# Patient Record
Sex: FEMALE | Race: WHITE | NOT HISPANIC OR LATINO
[De-identification: names, ages, dates, MRNs, and addresses within clinical notes are randomized per-mention and may not be internally consistent; named-entity substitution may affect disease eponyms.]

---

## 2017-07-14 ENCOUNTER — APPOINTMENT (OUTPATIENT)
Dept: OBGYN | Facility: CLINIC | Age: 37
End: 2017-07-14

## 2017-10-05 ENCOUNTER — APPOINTMENT (OUTPATIENT)
Dept: DERMATOLOGY | Facility: CLINIC | Age: 37
End: 2017-10-05

## 2017-11-03 ENCOUNTER — INPATIENT (INPATIENT)
Facility: HOSPITAL | Age: 37
LOS: 4 days | Discharge: HOME | End: 2017-11-08
Attending: OBSTETRICS & GYNECOLOGY | Admitting: OBSTETRICS & GYNECOLOGY
Payer: COMMERCIAL

## 2017-11-03 VITALS — WEIGHT: 156.53 LBS | HEIGHT: 64 IN

## 2017-11-03 LAB
ABO RH CONFIRMATION: SIGNIFICANT CHANGE UP
BLD GP AB SCN SERPL QL: SIGNIFICANT CHANGE UP
BLD GP AB SCN SERPL QL: SIGNIFICANT CHANGE UP
HCT VFR BLD CALC: 37.2 % — SIGNIFICANT CHANGE UP (ref 34.5–45)
HGB BLD-MCNC: 12.5 G/DL — SIGNIFICANT CHANGE UP (ref 11.5–15.5)
MCHC RBC-ENTMCNC: 30.2 PG — SIGNIFICANT CHANGE UP (ref 27–34)
MCHC RBC-ENTMCNC: 33.6 GM/DL — SIGNIFICANT CHANGE UP (ref 32–36)
MCV RBC AUTO: 89.8 FL — SIGNIFICANT CHANGE UP (ref 80–100)
PLATELET # BLD AUTO: 212 K/UL — SIGNIFICANT CHANGE UP (ref 150–400)
RBC # BLD: 4.14 M/UL — SIGNIFICANT CHANGE UP (ref 3.8–5.2)
RBC # FLD: 12.1 % — SIGNIFICANT CHANGE UP (ref 10.3–14.5)
T PALLIDUM AB TITR SER: NEGATIVE — SIGNIFICANT CHANGE UP
TYPE + AB SCN PNL BLD: SIGNIFICANT CHANGE UP
TYPE + AB SCN PNL BLD: SIGNIFICANT CHANGE UP
WBC # BLD: 11.4 K/UL — HIGH (ref 3.8–10.5)
WBC # FLD AUTO: 11.4 K/UL — HIGH (ref 3.8–10.5)

## 2017-11-03 PROCEDURE — 59514 CESAREAN DELIVERY ONLY: CPT | Mod: 80,U9

## 2017-11-03 RX ORDER — CITRIC ACID/SODIUM CITRATE 300-500 MG
15 SOLUTION, ORAL ORAL EVERY 4 HOURS
Qty: 0 | Refills: 0 | Status: DISCONTINUED | OUTPATIENT
Start: 2017-11-03 | End: 2017-11-05

## 2017-11-03 RX ORDER — PENICILLIN G POTASSIUM 5000000 [IU]/1
5 POWDER, FOR SOLUTION INTRAMUSCULAR; INTRAPLEURAL; INTRATHECAL; INTRAVENOUS ONCE
Qty: 0 | Refills: 0 | Status: COMPLETED | OUTPATIENT
Start: 2017-11-03 | End: 2017-11-03

## 2017-11-03 RX ORDER — SODIUM CHLORIDE 9 MG/ML
1000 INJECTION, SOLUTION INTRAVENOUS
Qty: 0 | Refills: 0 | Status: DISCONTINUED | OUTPATIENT
Start: 2017-11-03 | End: 2017-11-08

## 2017-11-03 RX ORDER — PENICILLIN G POTASSIUM 5000000 [IU]/1
POWDER, FOR SOLUTION INTRAMUSCULAR; INTRAPLEURAL; INTRATHECAL; INTRAVENOUS
Qty: 0 | Refills: 0 | Status: DISCONTINUED | OUTPATIENT
Start: 2017-11-03 | End: 2017-11-05

## 2017-11-03 RX ORDER — OXYTOCIN 10 UNIT/ML
2 VIAL (ML) INJECTION
Qty: 20 | Refills: 0 | Status: COMPLETED | OUTPATIENT
Start: 2017-11-03

## 2017-11-03 RX ORDER — PENICILLIN G POTASSIUM 5000000 [IU]/1
2.5 POWDER, FOR SOLUTION INTRAMUSCULAR; INTRAPLEURAL; INTRATHECAL; INTRAVENOUS EVERY 4 HOURS
Qty: 0 | Refills: 0 | Status: DISCONTINUED | OUTPATIENT
Start: 2017-11-03 | End: 2017-11-05

## 2017-11-03 RX ORDER — SODIUM CHLORIDE 9 MG/ML
1000 INJECTION, SOLUTION INTRAVENOUS ONCE
Qty: 0 | Refills: 0 | Status: DISCONTINUED | OUTPATIENT
Start: 2017-11-03 | End: 2017-11-08

## 2017-11-03 RX ADMIN — SODIUM CHLORIDE 125 MILLILITER(S): 9 INJECTION, SOLUTION INTRAVENOUS at 13:45

## 2017-11-03 RX ADMIN — SODIUM CHLORIDE 125 MILLILITER(S): 9 INJECTION, SOLUTION INTRAVENOUS at 14:29

## 2017-11-04 RX ORDER — DOCUSATE SODIUM 100 MG
100 CAPSULE ORAL
Qty: 0 | Refills: 0 | Status: DISCONTINUED | OUTPATIENT
Start: 2017-11-04 | End: 2017-11-08

## 2017-11-04 RX ORDER — DIPHENHYDRAMINE HCL 50 MG
25 CAPSULE ORAL EVERY 4 HOURS
Qty: 0 | Refills: 0 | Status: DISCONTINUED | OUTPATIENT
Start: 2017-11-05 | End: 2017-11-08

## 2017-11-04 RX ORDER — GLYCERIN ADULT
1 SUPPOSITORY, RECTAL RECTAL AT BEDTIME
Qty: 0 | Refills: 0 | Status: DISCONTINUED | OUTPATIENT
Start: 2017-11-04 | End: 2017-11-08

## 2017-11-04 RX ORDER — ACETAMINOPHEN 500 MG
650 TABLET ORAL EVERY 6 HOURS
Qty: 0 | Refills: 0 | Status: DISCONTINUED | OUTPATIENT
Start: 2017-11-04 | End: 2017-11-08

## 2017-11-04 RX ORDER — OXYTOCIN 10 UNIT/ML
2 VIAL (ML) INJECTION
Qty: 20 | Refills: 0 | Status: COMPLETED | OUTPATIENT
Start: 2017-11-04 | End: 2017-11-04

## 2017-11-04 RX ORDER — NALOXONE HYDROCHLORIDE 4 MG/.1ML
0.1 SPRAY NASAL
Qty: 0 | Refills: 0 | Status: DISCONTINUED | OUTPATIENT
Start: 2017-11-05 | End: 2017-11-08

## 2017-11-04 RX ORDER — SIMETHICONE 80 MG/1
80 TABLET, CHEWABLE ORAL EVERY 4 HOURS
Qty: 0 | Refills: 0 | Status: DISCONTINUED | OUTPATIENT
Start: 2017-11-04 | End: 2017-11-08

## 2017-11-04 RX ORDER — FERROUS SULFATE 325(65) MG
325 TABLET ORAL DAILY
Qty: 0 | Refills: 0 | Status: DISCONTINUED | OUTPATIENT
Start: 2017-11-04 | End: 2017-11-08

## 2017-11-04 RX ORDER — LANOLIN
1 OINTMENT (GRAM) TOPICAL
Qty: 0 | Refills: 0 | Status: DISCONTINUED | OUTPATIENT
Start: 2017-11-04 | End: 2017-11-08

## 2017-11-04 RX ORDER — TETANUS TOXOID, REDUCED DIPHTHERIA TOXOID AND ACELLULAR PERTUSSIS VACCINE, ADSORBED 5; 2.5; 8; 8; 2.5 [IU]/.5ML; [IU]/.5ML; UG/.5ML; UG/.5ML; UG/.5ML
0.5 SUSPENSION INTRAMUSCULAR ONCE
Qty: 0 | Refills: 0 | Status: DISCONTINUED | OUTPATIENT
Start: 2017-11-04 | End: 2017-11-08

## 2017-11-04 RX ORDER — METOCLOPRAMIDE HCL 10 MG
10 TABLET ORAL ONCE
Qty: 0 | Refills: 0 | Status: DISCONTINUED | OUTPATIENT
Start: 2017-11-04 | End: 2017-11-08

## 2017-11-04 RX ORDER — OXYTOCIN 10 UNIT/ML
2 VIAL (ML) INJECTION
Qty: 30 | Refills: 0 | Status: DISCONTINUED | OUTPATIENT
Start: 2017-11-04 | End: 2017-11-08

## 2017-11-04 RX ORDER — DIPHENHYDRAMINE HCL 50 MG
25 CAPSULE ORAL EVERY 6 HOURS
Qty: 0 | Refills: 0 | Status: DISCONTINUED | OUTPATIENT
Start: 2017-11-04 | End: 2017-11-08

## 2017-11-04 RX ORDER — HYDROMORPHONE HYDROCHLORIDE 2 MG/ML
1 INJECTION INTRAMUSCULAR; INTRAVENOUS; SUBCUTANEOUS
Qty: 0 | Refills: 0 | Status: DISCONTINUED | OUTPATIENT
Start: 2017-11-05 | End: 2017-11-08

## 2017-11-04 RX ORDER — IBUPROFEN 200 MG
600 TABLET ORAL EVERY 6 HOURS
Qty: 0 | Refills: 0 | Status: DISCONTINUED | OUTPATIENT
Start: 2017-11-04 | End: 2017-11-08

## 2017-11-04 RX ORDER — MORPHINE SULFATE 50 MG/1
0.15 CAPSULE, EXTENDED RELEASE ORAL ONCE
Qty: 0 | Refills: 0 | Status: DISCONTINUED | OUTPATIENT
Start: 2017-11-05 | End: 2017-11-08

## 2017-11-04 RX ORDER — OXYCODONE HYDROCHLORIDE 5 MG/1
5 TABLET ORAL
Qty: 0 | Refills: 0 | Status: DISCONTINUED | OUTPATIENT
Start: 2017-11-05 | End: 2017-11-08

## 2017-11-04 RX ORDER — ONDANSETRON 8 MG/1
4 TABLET, FILM COATED ORAL EVERY 6 HOURS
Qty: 0 | Refills: 0 | Status: DISCONTINUED | OUTPATIENT
Start: 2017-11-05 | End: 2017-11-08

## 2017-11-04 RX ORDER — OXYCODONE HYDROCHLORIDE 5 MG/1
10 TABLET ORAL
Qty: 0 | Refills: 0 | Status: DISCONTINUED | OUTPATIENT
Start: 2017-11-05 | End: 2017-11-08

## 2017-11-04 RX ORDER — HEPARIN SODIUM 5000 [USP'U]/ML
5000 INJECTION INTRAVENOUS; SUBCUTANEOUS EVERY 12 HOURS
Qty: 0 | Refills: 0 | Status: CANCELLED | OUTPATIENT
Start: 2017-11-05 | End: 2017-11-08

## 2017-11-04 RX ADMIN — PENICILLIN G POTASSIUM 200 MILLION UNIT(S): 5000000 POWDER, FOR SOLUTION INTRAMUSCULAR; INTRAPLEURAL; INTRATHECAL; INTRAVENOUS at 20:50

## 2017-11-04 RX ADMIN — SODIUM CHLORIDE 125 MILLILITER(S): 9 INJECTION, SOLUTION INTRAVENOUS at 22:53

## 2017-11-04 RX ADMIN — SODIUM CHLORIDE 125 MILLILITER(S): 9 INJECTION, SOLUTION INTRAVENOUS at 12:26

## 2017-11-04 RX ADMIN — PENICILLIN G POTASSIUM 200 MILLION UNIT(S): 5000000 POWDER, FOR SOLUTION INTRAMUSCULAR; INTRAPLEURAL; INTRATHECAL; INTRAVENOUS at 16:11

## 2017-11-04 RX ADMIN — Medication 6 MILLIUNIT(S)/MIN: at 23:18

## 2017-11-04 RX ADMIN — Medication 2 MILLIUNIT(S)/MIN: at 15:11

## 2017-11-05 LAB
BASOPHILS # BLD AUTO: 0 K/UL — SIGNIFICANT CHANGE UP (ref 0–0.2)
BASOPHILS NFR BLD AUTO: 0.2 % — SIGNIFICANT CHANGE UP (ref 0–2)
EOSINOPHIL # BLD AUTO: 0.1 K/UL — SIGNIFICANT CHANGE UP (ref 0–0.5)
EOSINOPHIL NFR BLD AUTO: 0.6 % — SIGNIFICANT CHANGE UP (ref 0–6)
HCT VFR BLD CALC: 34.3 % — LOW (ref 34.5–45)
HGB BLD-MCNC: 11.2 G/DL — LOW (ref 11.5–15.5)
LYMPHOCYTES # BLD AUTO: 1.2 K/UL — SIGNIFICANT CHANGE UP (ref 1–3.3)
LYMPHOCYTES # BLD AUTO: 6.8 % — LOW (ref 13–44)
MCHC RBC-ENTMCNC: 30 PG — SIGNIFICANT CHANGE UP (ref 27–34)
MCHC RBC-ENTMCNC: 32.7 GM/DL — SIGNIFICANT CHANGE UP (ref 32–36)
MCV RBC AUTO: 91.7 FL — SIGNIFICANT CHANGE UP (ref 80–100)
MONOCYTES # BLD AUTO: 0.7 K/UL — SIGNIFICANT CHANGE UP (ref 0–0.9)
MONOCYTES NFR BLD AUTO: 3.8 % — SIGNIFICANT CHANGE UP (ref 2–14)
NEUTROPHILS # BLD AUTO: 15.6 K/UL — HIGH (ref 1.8–7.4)
NEUTROPHILS NFR BLD AUTO: 88.7 % — HIGH (ref 43–77)
PLATELET # BLD AUTO: 183 K/UL — SIGNIFICANT CHANGE UP (ref 150–400)
RBC # BLD: 3.74 M/UL — LOW (ref 3.8–5.2)
RBC # FLD: 12.6 % — SIGNIFICANT CHANGE UP (ref 10.3–14.5)
WBC # BLD: 17.5 K/UL — HIGH (ref 3.8–10.5)
WBC # FLD AUTO: 17.5 K/UL — HIGH (ref 3.8–10.5)

## 2017-11-05 RX ADMIN — Medication 600 MILLIGRAM(S): at 21:50

## 2017-11-05 RX ADMIN — Medication 1 TABLET(S): at 12:44

## 2017-11-05 RX ADMIN — Medication 100 MILLIGRAM(S): at 20:50

## 2017-11-05 RX ADMIN — Medication 600 MILLIGRAM(S): at 13:45

## 2017-11-05 RX ADMIN — Medication 600 MILLIGRAM(S): at 20:50

## 2017-11-05 RX ADMIN — Medication 600 MILLIGRAM(S): at 06:57

## 2017-11-05 RX ADMIN — OXYCODONE HYDROCHLORIDE 10 MILLIGRAM(S): 5 TABLET ORAL at 21:09

## 2017-11-05 RX ADMIN — Medication 600 MILLIGRAM(S): at 12:46

## 2017-11-05 RX ADMIN — OXYCODONE HYDROCHLORIDE 5 MILLIGRAM(S): 5 TABLET ORAL at 13:45

## 2017-11-05 RX ADMIN — SODIUM CHLORIDE 125 MILLILITER(S): 9 INJECTION, SOLUTION INTRAVENOUS at 06:58

## 2017-11-05 RX ADMIN — HEPARIN SODIUM 5000 UNIT(S): 5000 INJECTION INTRAVENOUS; SUBCUTANEOUS at 06:57

## 2017-11-05 RX ADMIN — HEPARIN SODIUM 5000 UNIT(S): 5000 INJECTION INTRAVENOUS; SUBCUTANEOUS at 18:05

## 2017-11-05 RX ADMIN — Medication 600 MILLIGRAM(S): at 07:57

## 2017-11-05 RX ADMIN — Medication 325 MILLIGRAM(S): at 12:44

## 2017-11-05 RX ADMIN — OXYCODONE HYDROCHLORIDE 10 MILLIGRAM(S): 5 TABLET ORAL at 22:09

## 2017-11-05 RX ADMIN — SIMETHICONE 80 MILLIGRAM(S): 80 TABLET, CHEWABLE ORAL at 20:50

## 2017-11-05 RX ADMIN — OXYCODONE HYDROCHLORIDE 5 MILLIGRAM(S): 5 TABLET ORAL at 12:45

## 2017-11-05 NOTE — PROGRESS NOTE ADULT - SUBJECTIVE AND OBJECTIVE BOX
Postpartum Note,  Section  She is a  37y woman who is now post-operative day: 1    Subjective:  The patient feels very sad, that the delivery did not go according to her plan/ her baby is in special care being monitored for low sugar.   She is ambulating.   She is tolerating regular diet.  She denies nausea and vomiting.    Her pain is controlled.  She reports normal postpartum bleeding.  She is breastfeeding.      Physical exam:    Vital Signs Last 24 Hrs  T(C): 37.2 (2017 07:10), Max: 37.2 (2017 07:10)  T(F): 99 (2017 07:10), Max: 99 (2017 07:10)  HR: 83 (2017 07:10) (69 - 86)  BP: 120/65 (2017 07:10) (109/61 - 132/76)  BP(mean): --  RR: 16 (2017 07:10) (16 - 18)  SpO2: 98% (2017 07:10) (98% - 100%)    Gen: NAD  Breast: Soft, nontender, not engorged.  Abdomen: Soft, nontender, no distension , firm uterine fundus at umbilicus.  Incision: dressing in place   Pelvic: Normal lochia noted  Ext: No calf tenderness    LABS:                        12.5   11.4  )-----------( 212      ( 2017 14:58 )             37.2     B negative    Rubella status:immune     Allergies    No Known Allergies    Intolerances      MEDICATIONS  (STANDING):  diphtheria/tetanus/pertussis (acellular) Vaccine (ADAcel) 0.5 milliLiter(s) IntraMuscular once  ferrous    sulfate 325 milliGRAM(s) Oral daily  lactated ringers Bolus 1000 milliLiter(s) IV Bolus once  lactated ringers. 1000 milliLiter(s) (125 mL/Hr) IV Continuous <Continuous>  morphine PF Spinal 0.15 milliGRAM(s) IntraThecal. once  oxytocin Infusion 2 milliUNIT(s)/Min (2 mL/Hr) IV Continuous <Continuous>  prenatal multivitamin 1 Tablet(s) Oral daily    MEDICATIONS  (PRN):  acetaminophen   Tablet 650 milliGRAM(s) Oral every 6 hours PRN For Temp greater than 38.5 C (101.3 F)  diphenhydrAMINE   Capsule 25 milliGRAM(s) Oral every 6 hours PRN Itching  diphenhydrAMINE   Injectable 25 milliGRAM(s) IV Push every 4 hours PRN Pruritus  docusate sodium 100 milliGRAM(s) Oral two times a day PRN Stool Softening  glycerin Suppository - Adult 1 Suppository(s) Rectal at bedtime PRN Constipation  HYDROmorphone  Injectable 1 milliGRAM(s) IV Push every 3 hours PRN Severe Pain  ibuprofen  Tablet 600 milliGRAM(s) Oral every 6 hours PRN Mild pain or headache  lanolin Ointment 1 Application(s) Topical every 3 hours PRN Sore Nipples  metoclopramide Injectable 10 milliGRAM(s) IV Push once PRN Nausea and/or Vomiting  naloxone Injectable 0.1 milliGRAM(s) IV Push every 3 minutes PRN For ANY of the following changes in patient status:  A. RR LESS THAN 10 breaths per minute, B. Oxygen saturation LESS THAN 90%, C. Sedation score of 6  ondansetron Injectable 4 milliGRAM(s) IV Push every 6 hours PRN Nausea  oxyCODONE    IR 5 milliGRAM(s) Oral every 3 hours PRN Mild Pain  oxyCODONE    IR 10 milliGRAM(s) Oral every 3 hours PRN Moderate Pain  simethicone 80 milliGRAM(s) Chew every 4 hours PRN Gas        Assessment and Plan  POD # 1  s/p cesearean section  Doing well.  Encourage ambulation.    Follow up baby type and screen

## 2017-11-06 RX ADMIN — OXYCODONE HYDROCHLORIDE 10 MILLIGRAM(S): 5 TABLET ORAL at 07:00

## 2017-11-06 RX ADMIN — HEPARIN SODIUM 5000 UNIT(S): 5000 INJECTION INTRAVENOUS; SUBCUTANEOUS at 18:09

## 2017-11-06 RX ADMIN — OXYCODONE HYDROCHLORIDE 10 MILLIGRAM(S): 5 TABLET ORAL at 07:25

## 2017-11-06 RX ADMIN — Medication 1 TABLET(S): at 12:22

## 2017-11-06 RX ADMIN — Medication 600 MILLIGRAM(S): at 03:59

## 2017-11-06 RX ADMIN — Medication 600 MILLIGRAM(S): at 18:09

## 2017-11-06 RX ADMIN — Medication 600 MILLIGRAM(S): at 12:22

## 2017-11-06 RX ADMIN — Medication 600 MILLIGRAM(S): at 02:59

## 2017-11-06 RX ADMIN — HEPARIN SODIUM 5000 UNIT(S): 5000 INJECTION INTRAVENOUS; SUBCUTANEOUS at 07:24

## 2017-11-06 NOTE — PROGRESS NOTE ADULT - SUBJECTIVE AND OBJECTIVE BOX
Postpartum Note,  Section  She is a  37y woman who is now post-operative day: 2  chart reviewed and nursing input solicited    Subjective:  The patient feels well.  complaints: none  her pain is controlled; infant in special care with IV for hypoglycemia -- emotionally appropriate  she reports normal postpartum bleeding--- she is tolerating a regular diet and ambulating well.  focused ROS: negative      Physical exam:    Vital Signs Last 24 Hrs  T(C): 36.7 (2017 07:30), Max: 36.9 (2017 11:15)  T(F): 98.1 (2017 07:30), Max: 98.5 (2017 11:15)  HR: 73 (2017 07:30) (73 - 97)  BP: 110/51 (2017 07:30) (97/52 - 113/52)  BP(mean): --  RR: 16 (2017 07:30) (16 - 16)  SpO2: 99% (2017 07:30) (98% - 99%)     Abdomen: Soft, nontender, non-distended, uterine fundus firm and  below umbilicus.  Incision: Clean, dry, and intact    Pelvic: Normal lochia noted  Ext: lower extremeities symmetric and calves non-tender    LABS:                        11.2   17.5  )-----------( 183      ( 2017 18:08 )             34.3       Rubella status: immune  RH status: positive    Allergies    No Known Allergies    Intolerances      MEDICATIONS  (STANDING):  diphtheria/tetanus/pertussis (acellular) Vaccine (ADAcel) 0.5 milliLiter(s) IntraMuscular once  ferrous    sulfate 325 milliGRAM(s) Oral daily  lactated ringers Bolus 1000 milliLiter(s) IV Bolus once  lactated ringers. 1000 milliLiter(s) (125 mL/Hr) IV Continuous <Continuous>  morphine PF Spinal 0.15 milliGRAM(s) IntraThecal. once  oxytocin Infusion 2 milliUNIT(s)/Min (2 mL/Hr) IV Continuous <Continuous>  prenatal multivitamin 1 Tablet(s) Oral daily    MEDICATIONS  (PRN):  acetaminophen   Tablet 650 milliGRAM(s) Oral every 6 hours PRN For Temp greater than 38.5 C (101.3 F)  diphenhydrAMINE   Capsule 25 milliGRAM(s) Oral every 6 hours PRN Itching  diphenhydrAMINE   Injectable 25 milliGRAM(s) IV Push every 4 hours PRN Pruritus  docusate sodium 100 milliGRAM(s) Oral two times a day PRN Stool Softening  glycerin Suppository - Adult 1 Suppository(s) Rectal at bedtime PRN Constipation  HYDROmorphone  Injectable 1 milliGRAM(s) IV Push every 3 hours PRN Severe Pain  ibuprofen  Tablet 600 milliGRAM(s) Oral every 6 hours PRN Mild pain or headache  lanolin Ointment 1 Application(s) Topical every 3 hours PRN Sore Nipples  metoclopramide Injectable 10 milliGRAM(s) IV Push once PRN Nausea and/or Vomiting  naloxone Injectable 0.1 milliGRAM(s) IV Push every 3 minutes PRN For ANY of the following changes in patient status:  A. RR LESS THAN 10 breaths per minute, B. Oxygen saturation LESS THAN 90%, C. Sedation score of 6  ondansetron Injectable 4 milliGRAM(s) IV Push every 6 hours PRN Nausea  oxyCODONE    IR 5 milliGRAM(s) Oral every 3 hours PRN Mild Pain  oxyCODONE    IR 10 milliGRAM(s) Oral every 3 hours PRN Moderate Pain  simethicone 80 milliGRAM(s) Chew every 4 hours PRN Gas        Assessment and Plan  Doing well. Routine care

## 2017-11-07 RX ADMIN — HEPARIN SODIUM 5000 UNIT(S): 5000 INJECTION INTRAVENOUS; SUBCUTANEOUS at 17:30

## 2017-11-07 RX ADMIN — Medication 600 MILLIGRAM(S): at 01:54

## 2017-11-07 RX ADMIN — Medication 600 MILLIGRAM(S): at 20:30

## 2017-11-07 RX ADMIN — Medication 1 TABLET(S): at 11:43

## 2017-11-07 RX ADMIN — Medication 600 MILLIGRAM(S): at 02:45

## 2017-11-07 RX ADMIN — Medication 600 MILLIGRAM(S): at 19:34

## 2017-11-07 RX ADMIN — Medication 600 MILLIGRAM(S): at 08:46

## 2017-11-07 RX ADMIN — HEPARIN SODIUM 5000 UNIT(S): 5000 INJECTION INTRAVENOUS; SUBCUTANEOUS at 06:55

## 2017-11-07 NOTE — PROGRESS NOTE ADULT - SUBJECTIVE AND OBJECTIVE BOX
Postpartum Note,  Section  She is a  37y woman who is now post-operative day: 3  chart reviewed and nursing input solicited    Subjective:  The patient feels well. Interviewed in the nursery  complaints: none  her pain is controlled  she reports normal postpartum bleeding--- she is tolerating a regular diet and ambulating well.  focused ROS: negative      Physical exam:    Vital Signs Last 24 Hrs  T(C): 36.7 (2017 07:40), Max: 36.9 (2017 19:30)  T(F): 98 (2017 07:40), Max: 98.4 (2017 19:30)  HR: 86 (2017 07:40) (86 - 89)  BP: 120/77 (2017 07:40) (120/77 - 123/68)  BP(mean): --  RR: 16 (2017 07:40) (16 - 16)  SpO2: 100% (2017 07:40) (98% - 100%)     Abdomen: Soft, nontender, non-distended, uterine fundus firm and  below umbilicus.  Incision: Clean, dry, and intact with steri strips -- per nursing  Pelvic: Normal lochia noted -- per nursing  Ext: lower extremeities symmetric and calves non-tender    LABS:                        11.2   17.5  )-----------( 183      ( 2017 18:08 )             34.3       Rubella status: immune  RH status: pos    Allergies    No Known Allergies    Intolerances      MEDICATIONS  (STANDING):  diphtheria/tetanus/pertussis (acellular) Vaccine (ADAcel) 0.5 milliLiter(s) IntraMuscular once  ferrous    sulfate 325 milliGRAM(s) Oral daily  lactated ringers Bolus 1000 milliLiter(s) IV Bolus once  lactated ringers. 1000 milliLiter(s) (125 mL/Hr) IV Continuous <Continuous>  morphine PF Spinal 0.15 milliGRAM(s) IntraThecal. once  oxytocin Infusion 2 milliUNIT(s)/Min (2 mL/Hr) IV Continuous <Continuous>  prenatal multivitamin 1 Tablet(s) Oral daily    MEDICATIONS  (PRN):  acetaminophen   Tablet 650 milliGRAM(s) Oral every 6 hours PRN For Temp greater than 38.5 C (101.3 F)  diphenhydrAMINE   Capsule 25 milliGRAM(s) Oral every 6 hours PRN Itching  diphenhydrAMINE   Injectable 25 milliGRAM(s) IV Push every 4 hours PRN Pruritus  docusate sodium 100 milliGRAM(s) Oral two times a day PRN Stool Softening  glycerin Suppository - Adult 1 Suppository(s) Rectal at bedtime PRN Constipation  HYDROmorphone  Injectable 1 milliGRAM(s) IV Push every 3 hours PRN Severe Pain  ibuprofen  Tablet 600 milliGRAM(s) Oral every 6 hours PRN Mild pain or headache  lanolin Ointment 1 Application(s) Topical every 3 hours PRN Sore Nipples  metoclopramide Injectable 10 milliGRAM(s) IV Push once PRN Nausea and/or Vomiting  naloxone Injectable 0.1 milliGRAM(s) IV Push every 3 minutes PRN For ANY of the following changes in patient status:  A. RR LESS THAN 10 breaths per minute, B. Oxygen saturation LESS THAN 90%, C. Sedation score of 6  ondansetron Injectable 4 milliGRAM(s) IV Push every 6 hours PRN Nausea  oxyCODONE    IR 5 milliGRAM(s) Oral every 3 hours PRN Mild Pain  oxyCODONE    IR 10 milliGRAM(s) Oral every 3 hours PRN Moderate Pain  simethicone 80 milliGRAM(s) Chew every 4 hours PRN Gas        Assessment and Plan  Doing well. Routine care -- plan discharge in the AM -- check CBC in the AM

## 2017-11-08 ENCOUNTER — TRANSCRIPTION ENCOUNTER (OUTPATIENT)
Age: 37
End: 2017-11-08

## 2017-11-08 VITALS
DIASTOLIC BLOOD PRESSURE: 67 MMHG | OXYGEN SATURATION: 100 % | HEART RATE: 74 BPM | TEMPERATURE: 98 F | SYSTOLIC BLOOD PRESSURE: 114 MMHG | RESPIRATION RATE: 16 BRPM

## 2017-11-08 RX ADMIN — Medication 600 MILLIGRAM(S): at 16:36

## 2017-11-08 RX ADMIN — Medication 325 MILLIGRAM(S): at 17:42

## 2017-11-08 RX ADMIN — HEPARIN SODIUM 5000 UNIT(S): 5000 INJECTION INTRAVENOUS; SUBCUTANEOUS at 06:51

## 2017-11-08 RX ADMIN — HEPARIN SODIUM 5000 UNIT(S): 5000 INJECTION INTRAVENOUS; SUBCUTANEOUS at 17:43

## 2017-11-08 RX ADMIN — Medication 600 MILLIGRAM(S): at 10:20

## 2017-11-08 RX ADMIN — Medication 1 TABLET(S): at 16:35

## 2017-11-08 RX ADMIN — Medication 600 MILLIGRAM(S): at 01:47

## 2017-11-08 RX ADMIN — Medication 600 MILLIGRAM(S): at 02:45

## 2017-11-08 NOTE — DISCHARGE NOTE OB - PATIENT PORTAL LINK FT
“You can access the FollowHealth Patient Portal, offered by [facility], by registering with the following website: [email or web address]”

## 2017-11-08 NOTE — PROGRESS NOTE ADULT - SUBJECTIVE AND OBJECTIVE BOX
Postpartum Note,  Section  She is a  37y woman who is now post-operative day: 4  chart reviewed and nursing input solicited    Subjective:  The patient feels well.  complaints: none  her pain is controlled  she reports normal postpartum bleeding--- she is tolerating a regular diet and ambulating well.  focused ROS: negative      Physical exam:    Vital Signs Last 24 Hrs  T(C): 36.8 (2017 20:00), Max: 36.8 (2017 20:00)  T(F): 98.2 (2017 20:00), Max: 98.2 (2017 20:00)  HR: 91 (2017 20:00) (86 - 91)  BP: 125/61 (2017 20:00) (120/77 - 125/61)  BP(mean): --  RR: 16 (2017 20:00) (16 - 16)  SpO2: 98% (2017 20:00) (98% - 100%)     Abdomen: Soft, nontender, non-distended, uterine fundus firm and  below umbilicus.  Incision: Clean, dry, and intact  Pelvic: Normal lochia noted  Ext: lower extremeities symmetric and calves non-tender    LABS:      Rubella status: immune  RH status: pos    Allergies    No Known Allergies    Intolerances      MEDICATIONS  (STANDING):  diphtheria/tetanus/pertussis (acellular) Vaccine (ADAcel) 0.5 milliLiter(s) IntraMuscular once  ferrous    sulfate 325 milliGRAM(s) Oral daily  lactated ringers Bolus 1000 milliLiter(s) IV Bolus once  lactated ringers. 1000 milliLiter(s) (125 mL/Hr) IV Continuous <Continuous>  morphine PF Spinal 0.15 milliGRAM(s) IntraThecal. once  oxytocin Infusion 2 milliUNIT(s)/Min (2 mL/Hr) IV Continuous <Continuous>  prenatal multivitamin 1 Tablet(s) Oral daily    MEDICATIONS  (PRN):  acetaminophen   Tablet 650 milliGRAM(s) Oral every 6 hours PRN For Temp greater than 38.5 C (101.3 F)  diphenhydrAMINE   Capsule 25 milliGRAM(s) Oral every 6 hours PRN Itching  diphenhydrAMINE   Injectable 25 milliGRAM(s) IV Push every 4 hours PRN Pruritus  docusate sodium 100 milliGRAM(s) Oral two times a day PRN Stool Softening  glycerin Suppository - Adult 1 Suppository(s) Rectal at bedtime PRN Constipation  HYDROmorphone  Injectable 1 milliGRAM(s) IV Push every 3 hours PRN Severe Pain  ibuprofen  Tablet 600 milliGRAM(s) Oral every 6 hours PRN Mild pain or headache  lanolin Ointment 1 Application(s) Topical every 3 hours PRN Sore Nipples  metoclopramide Injectable 10 milliGRAM(s) IV Push once PRN Nausea and/or Vomiting  naloxone Injectable 0.1 milliGRAM(s) IV Push every 3 minutes PRN For ANY of the following changes in patient status:  A. RR LESS THAN 10 breaths per minute, B. Oxygen saturation LESS THAN 90%, C. Sedation score of 6  ondansetron Injectable 4 milliGRAM(s) IV Push every 6 hours PRN Nausea  oxyCODONE    IR 5 milliGRAM(s) Oral every 3 hours PRN Mild Pain  oxyCODONE    IR 10 milliGRAM(s) Oral every 3 hours PRN Moderate Pain  simethicone 80 milliGRAM(s) Chew every 4 hours PRN Gas        Assessment and Plan  Doing well. Routine care  Plan discharge today -- routine restrictions and instructions  F/U with MD in 3-7 days for incision check  All questions answered

## 2017-11-08 NOTE — DISCHARGE NOTE OB - CARE PLAN
Principal Discharge DX:	Term pregnancy delivered  Goal:	recovery  Instructions for follow-up, activity and diet:	routine post partum restrictions and instructions ---- vaginal restrictions ONLY

## 2017-11-08 NOTE — DISCHARGE NOTE OB - CARE PROVIDER_API CALL
Marcio Simon), Obstetrics and Gynecology  [address] [place] [address]  Phone: [phone]  Fax: [phone]

## 2017-11-09 DIAGNOSIS — O48.0 POST-TERM PREGNANCY: ICD-10-CM

## 2017-11-09 DIAGNOSIS — O09.513 SUPERVISION OF ELDERLY PRIMIGRAVIDA, THIRD TRIMESTER: ICD-10-CM

## 2017-11-09 DIAGNOSIS — Z3A.40 40 WEEKS GESTATION OF PREGNANCY: ICD-10-CM

## 2019-11-17 ENCOUNTER — TRANSCRIPTION ENCOUNTER (OUTPATIENT)
Age: 39
End: 2019-11-17

## 2020-03-17 NOTE — PATIENT PROFILE OB - SKIN TO SKIN
Rhombic Flap Text: The defect edges were debeveled with a #15 scalpel blade.  Given the location of the defect and the proximity to free margins a rhombic flap was deemed most appropriate.  Using a sterile surgical marker, an appropriate rhombic flap was drawn incorporating the defect.    The area thus outlined was incised deep to adipose tissue with a #15 scalpel blade.  The skin margins were undermined to an appropriate distance in all directions utilizing iris scissors. yes

## 2020-07-28 ENCOUNTER — OUTPATIENT (OUTPATIENT)
Dept: OUTPATIENT SERVICES | Facility: HOSPITAL | Age: 40
LOS: 1 days | End: 2020-07-28
Payer: COMMERCIAL

## 2020-07-28 DIAGNOSIS — Z01.818 ENCOUNTER FOR OTHER PREPROCEDURAL EXAMINATION: ICD-10-CM

## 2020-07-28 DIAGNOSIS — O34.211 MATERNAL CARE FOR LOW TRANSVERSE SCAR FROM PREVIOUS CESAREAN DELIVERY: ICD-10-CM

## 2020-07-28 LAB
BASOPHILS # BLD AUTO: 0.04 K/UL — SIGNIFICANT CHANGE UP (ref 0–0.2)
BASOPHILS NFR BLD AUTO: 0.5 % — SIGNIFICANT CHANGE UP (ref 0–2)
EOSINOPHIL # BLD AUTO: 0.11 K/UL — SIGNIFICANT CHANGE UP (ref 0–0.5)
EOSINOPHIL NFR BLD AUTO: 1.4 % — SIGNIFICANT CHANGE UP (ref 0–6)
HCT VFR BLD CALC: 40.7 % — SIGNIFICANT CHANGE UP (ref 34.5–45)
HGB BLD-MCNC: 13.5 G/DL — SIGNIFICANT CHANGE UP (ref 11.5–15.5)
IMM GRANULOCYTES NFR BLD AUTO: 0.5 % — SIGNIFICANT CHANGE UP (ref 0–1.5)
LYMPHOCYTES # BLD AUTO: 1.5 K/UL — SIGNIFICANT CHANGE UP (ref 1–3.3)
LYMPHOCYTES # BLD AUTO: 18.8 % — SIGNIFICANT CHANGE UP (ref 13–44)
MCHC RBC-ENTMCNC: 30.5 PG — SIGNIFICANT CHANGE UP (ref 27–34)
MCHC RBC-ENTMCNC: 33.2 GM/DL — SIGNIFICANT CHANGE UP (ref 32–36)
MCV RBC AUTO: 91.9 FL — SIGNIFICANT CHANGE UP (ref 80–100)
MONOCYTES # BLD AUTO: 0.44 K/UL — SIGNIFICANT CHANGE UP (ref 0–0.9)
MONOCYTES NFR BLD AUTO: 5.5 % — SIGNIFICANT CHANGE UP (ref 2–14)
NEUTROPHILS # BLD AUTO: 5.85 K/UL — SIGNIFICANT CHANGE UP (ref 1.8–7.4)
NEUTROPHILS NFR BLD AUTO: 73.3 % — SIGNIFICANT CHANGE UP (ref 43–77)
PLATELET # BLD AUTO: 200 K/UL — SIGNIFICANT CHANGE UP (ref 150–400)
RBC # BLD: 4.43 M/UL — SIGNIFICANT CHANGE UP (ref 3.8–5.2)
RBC # FLD: 13.1 % — SIGNIFICANT CHANGE UP (ref 10.3–14.5)
WBC # BLD: 7.98 K/UL — SIGNIFICANT CHANGE UP (ref 3.8–10.5)
WBC # FLD AUTO: 7.98 K/UL — SIGNIFICANT CHANGE UP (ref 3.8–10.5)

## 2020-07-28 PROCEDURE — 86850 RBC ANTIBODY SCREEN: CPT

## 2020-07-28 PROCEDURE — 86900 BLOOD TYPING SEROLOGIC ABO: CPT

## 2020-07-28 PROCEDURE — 85025 COMPLETE CBC W/AUTO DIFF WBC: CPT

## 2020-07-28 PROCEDURE — U0003: CPT

## 2020-07-28 PROCEDURE — 86901 BLOOD TYPING SEROLOGIC RH(D): CPT

## 2020-07-28 PROCEDURE — 36415 COLL VENOUS BLD VENIPUNCTURE: CPT

## 2020-07-29 ENCOUNTER — TRANSCRIPTION ENCOUNTER (OUTPATIENT)
Age: 40
End: 2020-07-29

## 2020-07-29 DIAGNOSIS — O34.211 MATERNAL CARE FOR LOW TRANSVERSE SCAR FROM PREVIOUS CESAREAN DELIVERY: ICD-10-CM

## 2020-07-29 DIAGNOSIS — Z01.818 ENCOUNTER FOR OTHER PREPROCEDURAL EXAMINATION: ICD-10-CM

## 2020-07-29 LAB — SARS-COV-2 RNA SPEC QL NAA+PROBE: SIGNIFICANT CHANGE UP

## 2020-07-30 ENCOUNTER — INPATIENT (INPATIENT)
Facility: HOSPITAL | Age: 40
LOS: 1 days | Discharge: HOME | End: 2020-08-01
Attending: OBSTETRICS & GYNECOLOGY | Admitting: OBSTETRICS & GYNECOLOGY
Payer: COMMERCIAL

## 2020-07-30 VITALS — HEIGHT: 64 IN | WEIGHT: 147.71 LBS

## 2020-07-30 DIAGNOSIS — O34.211 MATERNAL CARE FOR LOW TRANSVERSE SCAR FROM PREVIOUS CESAREAN DELIVERY: ICD-10-CM

## 2020-07-30 LAB — T PALLIDUM AB TITR SER: NEGATIVE — SIGNIFICANT CHANGE UP

## 2020-07-30 PROCEDURE — 85461 HEMOGLOBIN FETAL: CPT

## 2020-07-30 PROCEDURE — 94760 N-INVAS EAR/PLS OXIMETRY 1: CPT

## 2020-07-30 PROCEDURE — 85025 COMPLETE CBC W/AUTO DIFF WBC: CPT

## 2020-07-30 PROCEDURE — 85460 HEMOGLOBIN FETAL: CPT

## 2020-07-30 PROCEDURE — 86780 TREPONEMA PALLIDUM: CPT

## 2020-07-30 PROCEDURE — 86769 SARS-COV-2 COVID-19 ANTIBODY: CPT

## 2020-07-30 PROCEDURE — 36415 COLL VENOUS BLD VENIPUNCTURE: CPT

## 2020-07-30 PROCEDURE — 59050 FETAL MONITOR W/REPORT: CPT

## 2020-07-30 RX ORDER — SODIUM CHLORIDE 9 MG/ML
1000 INJECTION, SOLUTION INTRAVENOUS
Refills: 0 | Status: DISCONTINUED | OUTPATIENT
Start: 2020-07-30 | End: 2020-08-01

## 2020-07-30 RX ORDER — KETOROLAC TROMETHAMINE 30 MG/ML
30 SYRINGE (ML) INJECTION EVERY 6 HOURS
Refills: 0 | Status: DISCONTINUED | OUTPATIENT
Start: 2020-07-30 | End: 2020-07-30

## 2020-07-30 RX ORDER — OXYCODONE HYDROCHLORIDE 5 MG/1
5 TABLET ORAL ONCE
Refills: 0 | Status: DISCONTINUED | OUTPATIENT
Start: 2020-07-30 | End: 2020-07-30

## 2020-07-30 RX ORDER — CEFAZOLIN SODIUM 1 G
2000 VIAL (EA) INJECTION ONCE
Refills: 0 | Status: COMPLETED | OUTPATIENT
Start: 2020-07-30 | End: 2020-07-30

## 2020-07-30 RX ORDER — ACETAMINOPHEN 500 MG
1000 TABLET ORAL ONCE
Refills: 0 | Status: COMPLETED | OUTPATIENT
Start: 2020-07-30 | End: 2020-07-30

## 2020-07-30 RX ORDER — ACETAMINOPHEN 500 MG
975 TABLET ORAL
Refills: 0 | Status: DISCONTINUED | OUTPATIENT
Start: 2020-07-30 | End: 2020-07-30

## 2020-07-30 RX ORDER — MAGNESIUM HYDROXIDE 400 MG/1
30 TABLET, CHEWABLE ORAL
Refills: 0 | Status: DISCONTINUED | OUTPATIENT
Start: 2020-07-30 | End: 2020-07-30

## 2020-07-30 RX ORDER — OXYTOCIN 10 UNIT/ML
333.33 VIAL (ML) INJECTION
Qty: 20 | Refills: 0 | Status: DISCONTINUED | OUTPATIENT
Start: 2020-07-30 | End: 2020-07-30

## 2020-07-30 RX ORDER — TETANUS TOXOID, REDUCED DIPHTHERIA TOXOID AND ACELLULAR PERTUSSIS VACCINE, ADSORBED 5; 2.5; 8; 8; 2.5 [IU]/.5ML; [IU]/.5ML; UG/.5ML; UG/.5ML; UG/.5ML
0.5 SUSPENSION INTRAMUSCULAR ONCE
Refills: 0 | Status: DISCONTINUED | OUTPATIENT
Start: 2020-07-30 | End: 2020-08-01

## 2020-07-30 RX ORDER — OXYCODONE HYDROCHLORIDE 5 MG/1
5 TABLET ORAL
Refills: 0 | Status: DISCONTINUED | OUTPATIENT
Start: 2020-07-30 | End: 2020-08-01

## 2020-07-30 RX ORDER — IBUPROFEN 200 MG
600 TABLET ORAL EVERY 6 HOURS
Refills: 0 | Status: DISCONTINUED | OUTPATIENT
Start: 2020-07-30 | End: 2020-07-30

## 2020-07-30 RX ORDER — TETANUS TOXOID, REDUCED DIPHTHERIA TOXOID AND ACELLULAR PERTUSSIS VACCINE, ADSORBED 5; 2.5; 8; 8; 2.5 [IU]/.5ML; [IU]/.5ML; UG/.5ML; UG/.5ML; UG/.5ML
0.5 SUSPENSION INTRAMUSCULAR ONCE
Refills: 0 | Status: DISCONTINUED | OUTPATIENT
Start: 2020-07-30 | End: 2020-07-30

## 2020-07-30 RX ORDER — MORPHINE SULFATE 50 MG/1
0.1 CAPSULE, EXTENDED RELEASE ORAL ONCE
Refills: 0 | Status: DISCONTINUED | OUTPATIENT
Start: 2020-07-30 | End: 2020-07-30

## 2020-07-30 RX ORDER — OXYCODONE HYDROCHLORIDE 5 MG/1
10 TABLET ORAL
Refills: 0 | Status: DISCONTINUED | OUTPATIENT
Start: 2020-07-30 | End: 2020-08-01

## 2020-07-30 RX ORDER — OXYCODONE HYDROCHLORIDE 5 MG/1
5 TABLET ORAL
Refills: 0 | Status: DISCONTINUED | OUTPATIENT
Start: 2020-07-30 | End: 2020-07-30

## 2020-07-30 RX ORDER — MAGNESIUM HYDROXIDE 400 MG/1
30 TABLET, CHEWABLE ORAL
Refills: 0 | Status: DISCONTINUED | OUTPATIENT
Start: 2020-07-30 | End: 2020-08-01

## 2020-07-30 RX ORDER — HYDROMORPHONE HYDROCHLORIDE 2 MG/ML
1.5 INJECTION INTRAMUSCULAR; INTRAVENOUS; SUBCUTANEOUS
Refills: 0 | Status: DISCONTINUED | OUTPATIENT
Start: 2020-07-30 | End: 2020-07-30

## 2020-07-30 RX ORDER — DIPHENHYDRAMINE HCL 50 MG
25 CAPSULE ORAL EVERY 6 HOURS
Refills: 0 | Status: DISCONTINUED | OUTPATIENT
Start: 2020-07-30 | End: 2020-07-30

## 2020-07-30 RX ORDER — SODIUM CHLORIDE 9 MG/ML
1000 INJECTION, SOLUTION INTRAVENOUS ONCE
Refills: 0 | Status: COMPLETED | OUTPATIENT
Start: 2020-07-30 | End: 2020-07-30

## 2020-07-30 RX ORDER — DIPHENHYDRAMINE HCL 50 MG
25 CAPSULE ORAL EVERY 6 HOURS
Refills: 0 | Status: DISCONTINUED | OUTPATIENT
Start: 2020-07-30 | End: 2020-08-01

## 2020-07-30 RX ORDER — ACETAMINOPHEN 500 MG
975 TABLET ORAL
Refills: 0 | Status: DISCONTINUED | OUTPATIENT
Start: 2020-07-30 | End: 2020-08-01

## 2020-07-30 RX ORDER — SIMETHICONE 80 MG/1
80 TABLET, CHEWABLE ORAL EVERY 4 HOURS
Refills: 0 | Status: DISCONTINUED | OUTPATIENT
Start: 2020-07-30 | End: 2020-08-01

## 2020-07-30 RX ORDER — SIMETHICONE 80 MG/1
80 TABLET, CHEWABLE ORAL EVERY 4 HOURS
Refills: 0 | Status: DISCONTINUED | OUTPATIENT
Start: 2020-07-30 | End: 2020-07-30

## 2020-07-30 RX ORDER — KETOROLAC TROMETHAMINE 30 MG/ML
30 SYRINGE (ML) INJECTION EVERY 6 HOURS
Refills: 0 | Status: DISCONTINUED | OUTPATIENT
Start: 2020-07-30 | End: 2020-07-31

## 2020-07-30 RX ORDER — ONDANSETRON 8 MG/1
4 TABLET, FILM COATED ORAL EVERY 6 HOURS
Refills: 0 | Status: DISCONTINUED | OUTPATIENT
Start: 2020-07-30 | End: 2020-08-01

## 2020-07-30 RX ORDER — IBUPROFEN 200 MG
600 TABLET ORAL EVERY 6 HOURS
Refills: 0 | Status: COMPLETED | OUTPATIENT
Start: 2020-07-30 | End: 2021-06-28

## 2020-07-30 RX ORDER — FAMOTIDINE 10 MG/ML
20 INJECTION INTRAVENOUS ONCE
Refills: 0 | Status: COMPLETED | OUTPATIENT
Start: 2020-07-30 | End: 2020-07-30

## 2020-07-30 RX ORDER — LANOLIN
1 OINTMENT (GRAM) TOPICAL EVERY 6 HOURS
Refills: 0 | Status: DISCONTINUED | OUTPATIENT
Start: 2020-07-30 | End: 2020-08-01

## 2020-07-30 RX ORDER — SODIUM CHLORIDE 9 MG/ML
1000 INJECTION, SOLUTION INTRAVENOUS
Refills: 0 | Status: DISCONTINUED | OUTPATIENT
Start: 2020-07-30 | End: 2020-07-30

## 2020-07-30 RX ORDER — METOCLOPRAMIDE HCL 10 MG
10 TABLET ORAL ONCE
Refills: 0 | Status: DISCONTINUED | OUTPATIENT
Start: 2020-07-30 | End: 2020-07-30

## 2020-07-30 RX ORDER — LANOLIN
1 OINTMENT (GRAM) TOPICAL EVERY 6 HOURS
Refills: 0 | Status: DISCONTINUED | OUTPATIENT
Start: 2020-07-30 | End: 2020-07-30

## 2020-07-30 RX ORDER — NALOXONE HYDROCHLORIDE 4 MG/.1ML
0.1 SPRAY NASAL
Refills: 0 | Status: DISCONTINUED | OUTPATIENT
Start: 2020-07-30 | End: 2020-08-01

## 2020-07-30 RX ORDER — CITRIC ACID/SODIUM CITRATE 300-500 MG
30 SOLUTION, ORAL ORAL ONCE
Refills: 0 | Status: COMPLETED | OUTPATIENT
Start: 2020-07-30 | End: 2020-07-30

## 2020-07-30 RX ORDER — OXYTOCIN 10 UNIT/ML
333.33 VIAL (ML) INJECTION
Qty: 20 | Refills: 0 | Status: DISCONTINUED | OUTPATIENT
Start: 2020-07-30 | End: 2020-08-01

## 2020-07-30 RX ADMIN — Medication 975 MILLIGRAM(S): at 21:08

## 2020-07-30 RX ADMIN — Medication 30 MILLIGRAM(S): at 17:34

## 2020-07-30 RX ADMIN — Medication 100 MILLIGRAM(S): at 08:20

## 2020-07-30 RX ADMIN — SODIUM CHLORIDE 2000 MILLILITER(S): 9 INJECTION, SOLUTION INTRAVENOUS at 06:44

## 2020-07-30 RX ADMIN — FAMOTIDINE 20 MILLIGRAM(S): 10 INJECTION INTRAVENOUS at 07:25

## 2020-07-30 RX ADMIN — Medication 1000 MILLIUNIT(S)/MIN: at 08:44

## 2020-07-30 RX ADMIN — Medication 400 MILLIGRAM(S): at 10:09

## 2020-07-30 RX ADMIN — Medication 30 MILLIGRAM(S): at 18:38

## 2020-07-30 RX ADMIN — Medication 975 MILLIGRAM(S): at 21:38

## 2020-07-30 RX ADMIN — Medication 30 MILLILITER(S): at 07:23

## 2020-07-30 RX ADMIN — Medication 30 MILLIGRAM(S): at 10:09

## 2020-07-30 NOTE — PROGRESS NOTE ADULT - SUBJECTIVE AND OBJECTIVE BOX
Patient admitted for repeat C/S at term --- RBA previously d/w patient as an outpatient on multiple occasions --- procedure and protocols discussed now --- consent signed --- AQA -- plan mechanical prophylaxis for PE/DVT -- no heparin

## 2020-07-31 LAB
BASOPHILS # BLD AUTO: 0.03 K/UL — SIGNIFICANT CHANGE UP (ref 0–0.2)
BASOPHILS NFR BLD AUTO: 0.3 % — SIGNIFICANT CHANGE UP (ref 0–2)
EOSINOPHIL # BLD AUTO: 0.15 K/UL — SIGNIFICANT CHANGE UP (ref 0–0.5)
EOSINOPHIL NFR BLD AUTO: 1.6 % — SIGNIFICANT CHANGE UP (ref 0–6)
FETAL SCREEN: (no result)
HCT VFR BLD CALC: 33.7 % — LOW (ref 34.5–45)
HGB BLD-MCNC: 10.9 G/DL — LOW (ref 11.5–15.5)
IMM GRANULOCYTES NFR BLD AUTO: 0.4 % — SIGNIFICANT CHANGE UP (ref 0–1.5)
LYMPHOCYTES # BLD AUTO: 1.13 K/UL — SIGNIFICANT CHANGE UP (ref 1–3.3)
LYMPHOCYTES # BLD AUTO: 12.1 % — LOW (ref 13–44)
MCHC RBC-ENTMCNC: 30.4 PG — SIGNIFICANT CHANGE UP (ref 27–34)
MCHC RBC-ENTMCNC: 32.3 GM/DL — SIGNIFICANT CHANGE UP (ref 32–36)
MCV RBC AUTO: 93.9 FL — SIGNIFICANT CHANGE UP (ref 80–100)
MONOCYTES # BLD AUTO: 0.49 K/UL — SIGNIFICANT CHANGE UP (ref 0–0.9)
MONOCYTES NFR BLD AUTO: 5.3 % — SIGNIFICANT CHANGE UP (ref 2–14)
NEUTROPHILS # BLD AUTO: 7.47 K/UL — HIGH (ref 1.8–7.4)
NEUTROPHILS NFR BLD AUTO: 80.3 % — HIGH (ref 43–77)
PLATELET # BLD AUTO: 165 K/UL — SIGNIFICANT CHANGE UP (ref 150–400)
RBC # BLD: 3.59 M/UL — LOW (ref 3.8–5.2)
RBC # FLD: 13.4 % — SIGNIFICANT CHANGE UP (ref 10.3–14.5)
WBC # BLD: 9.31 K/UL — SIGNIFICANT CHANGE UP (ref 3.8–10.5)
WBC # FLD AUTO: 9.31 K/UL — SIGNIFICANT CHANGE UP (ref 3.8–10.5)

## 2020-07-31 RX ORDER — IBUPROFEN 200 MG
600 TABLET ORAL EVERY 6 HOURS
Refills: 0 | Status: DISCONTINUED | OUTPATIENT
Start: 2020-07-31 | End: 2020-08-01

## 2020-07-31 RX ADMIN — Medication 975 MILLIGRAM(S): at 17:12

## 2020-07-31 RX ADMIN — Medication 30 MILLIGRAM(S): at 06:20

## 2020-07-31 RX ADMIN — Medication 975 MILLIGRAM(S): at 21:58

## 2020-07-31 RX ADMIN — Medication 975 MILLIGRAM(S): at 02:59

## 2020-07-31 RX ADMIN — Medication 975 MILLIGRAM(S): at 09:52

## 2020-07-31 RX ADMIN — Medication 975 MILLIGRAM(S): at 16:12

## 2020-07-31 RX ADMIN — Medication 600 MILLIGRAM(S): at 12:47

## 2020-07-31 RX ADMIN — Medication 30 MILLIGRAM(S): at 00:22

## 2020-07-31 RX ADMIN — Medication 30 MILLIGRAM(S): at 06:50

## 2020-07-31 RX ADMIN — Medication 600 MILLIGRAM(S): at 18:38

## 2020-07-31 RX ADMIN — Medication 975 MILLIGRAM(S): at 03:29

## 2020-07-31 RX ADMIN — Medication 975 MILLIGRAM(S): at 21:28

## 2020-07-31 RX ADMIN — Medication 600 MILLIGRAM(S): at 13:47

## 2020-07-31 RX ADMIN — Medication 975 MILLIGRAM(S): at 08:52

## 2020-07-31 RX ADMIN — Medication 30 MILLIGRAM(S): at 00:52

## 2020-07-31 NOTE — PROVIDER CONTACT NOTE (CRITICAL VALUE NOTIFICATION) - SITUATION
Pt fetal screen came back positive. MD1 called to order KB test. Will draw blood work and then order correct amount of Rhogam.

## 2020-07-31 NOTE — PROGRESS NOTE ADULT - SUBJECTIVE AND OBJECTIVE BOX
Postpartum Note,  Section  She is a  40y woman who is now post-operative day: 1  chart reviewed and nursing input solicited    Subjective:  The patient feels well.  complaints: none  her pain is controlled  she reports normal postpartum bleeding--- she is tolerating a regular diet and ambulating well.  focused ROS: negative      Physical exam:    Vital Signs Last 24 Hrs  T(C): 36.7 (2020 04:00), Max: 36.8 (2020 12:30)  T(F): 98 (2020 04:00), Max: 98.3 (2020 16:39)  HR: 59 (2020 04:00) (57 - 76)  BP: 97/60 (2020 04:00) (96/54 - 112/71)  BP(mean): 75 (2020 09:35) (75 - 87)  RR: 16 (2020 04:00) (14 - 21)  SpO2: 97% (2020 04:00) (97% - 100%)     Abdomen: Soft, nontender, non-distended, uterine fundus firm and  below umbilicus.  Incision: dressing clean dry and intact  Pelvic: Normal lochia noted  Ext: lower extremeities symmetric and calves non-tender    LABS:              MEDICATIONS  (STANDING):  acetaminophen   Tablet .. 975 milliGRAM(s) Oral <User Schedule>  diphtheria/tetanus/pertussis (acellular) Vaccine (ADAcel) 0.5 milliLiter(s) IntraMuscular once  ibuprofen  Tablet. 600 milliGRAM(s) Oral every 6 hours  lactated ringers. 1000 milliLiter(s) (125 mL/Hr) IV Continuous <Continuous>  oxytocin Infusion 333.333 milliUNIT(s)/Min (1000 mL/Hr) IV Continuous <Continuous>    MEDICATIONS  (PRN):  diphenhydrAMINE 25 milliGRAM(s) Oral every 6 hours PRN Itching  lanolin Ointment 1 Application(s) Topical every 6 hours PRN Sore Nipples  magnesium hydroxide Suspension 30 milliLiter(s) Oral two times a day PRN Constipation  naloxone Injectable 0.1 milliGRAM(s) IV Push every 3 minutes PRN For ANY of the following changes in patient status:  A. RR LESS THAN 10 breaths per minute, B. Oxygen saturation LESS THAN 90%, C. Sedation score of 6  ondansetron Injectable 4 milliGRAM(s) IV Push every 6 hours PRN Nausea  oxyCODONE    IR 5 milliGRAM(s) Oral every 3 hours PRN Mild Pain (1 - 3)  oxyCODONE    IR 10 milliGRAM(s) Oral every 3 hours PRN Moderate Pain (4 - 6)  oxyCODONE    IR 5 milliGRAM(s) Oral every 3 hours PRN Moderate to Severe Pain (4-10)  simethicone 80 milliGRAM(s) Chew every 4 hours PRN Gas        Assessment and Plan  Doing well. Routine care ---- check CBC

## 2020-08-01 ENCOUNTER — TRANSCRIPTION ENCOUNTER (OUTPATIENT)
Age: 40
End: 2020-08-01

## 2020-08-01 VITALS
RESPIRATION RATE: 17 BRPM | SYSTOLIC BLOOD PRESSURE: 128 MMHG | HEART RATE: 86 BPM | OXYGEN SATURATION: 99 % | DIASTOLIC BLOOD PRESSURE: 78 MMHG | TEMPERATURE: 98 F

## 2020-08-01 RX ORDER — IBUPROFEN 200 MG
1 TABLET ORAL
Qty: 0 | Refills: 0 | DISCHARGE
Start: 2020-08-01

## 2020-08-01 RX ORDER — ACETAMINOPHEN 500 MG
3 TABLET ORAL
Qty: 0 | Refills: 0 | DISCHARGE
Start: 2020-08-01

## 2020-08-01 RX ADMIN — Medication 600 MILLIGRAM(S): at 00:08

## 2020-08-01 RX ADMIN — Medication 975 MILLIGRAM(S): at 03:13

## 2020-08-01 RX ADMIN — Medication 600 MILLIGRAM(S): at 13:51

## 2020-08-01 RX ADMIN — Medication 975 MILLIGRAM(S): at 03:43

## 2020-08-01 RX ADMIN — Medication 975 MILLIGRAM(S): at 09:10

## 2020-08-01 RX ADMIN — Medication 600 MILLIGRAM(S): at 00:38

## 2020-08-01 RX ADMIN — Medication 600 MILLIGRAM(S): at 06:41

## 2020-08-01 RX ADMIN — Medication 600 MILLIGRAM(S): at 06:11

## 2020-08-01 NOTE — DISCHARGE NOTE OB - PATIENT PORTAL LINK FT
You can access the FollowMyHealth Patient Portal offered by [facility] by registering at the following website: [email or web address]. By joining [employer]’s FollowMyHealth portal, you will also be able to view your health information using other applications (apps) compatible with our system.

## 2020-08-01 NOTE — DISCHARGE NOTE OB - PLAN OF CARE
rapid recovery Patient can transition to regular activity level and continue regular diet. Patient should follow up with Dr. Simon office to schedule post-op follow up and incision check visits. Patient should contact doctor earlier should she develop persistent/increased vaginal bleeding or fever.

## 2020-08-01 NOTE — DISCHARGE NOTE OB - MEDICATION SUMMARY - MEDICATIONS TO TAKE
I will START or STAY ON the medications listed below when I get home from the hospital:    acetaminophen 325 mg oral tablet  -- 3 tab(s) by mouth   -- Indication: For moderate pain    ibuprofen 600 mg oral tablet  -- 1 tab(s) by mouth every 6 hours  -- Indication: For moderate pain

## 2020-08-01 NOTE — DISCHARGE NOTE OB - MATERIALS PROVIDED
Guide to Postpartum Care/Breastfeeding Guide and Packet/[facility] Hearing Screen Program/Vaccinations/Breastfeeding Log/  Immunization Record/Back To Sleep Handout/Shaken Baby Prevention Handout/[facility] [place] Screening Program

## 2020-08-01 NOTE — PROGRESS NOTE ADULT - ATTENDING COMMENTS
patient seen and examined. Agree with note.  Discharge today POD 2 hemodynamically stable.  Call Dr Simon for follow up.

## 2020-08-01 NOTE — PROGRESS NOTE ADULT - SUBJECTIVE AND OBJECTIVE BOX
40y year old  now POD#2 s/p scheduled repeat  section at 39wks gestation, uncomplicated.      No acute overnight events.   Pain well controlled.  Patient is ambulating, +voiding, +Flatus, -BM  Reports minimal lochia.   +breast feeding, -breast tenderness  Denies dizziness, lightheadedness, SOB, palpitations, or fatigue at rest or while ambulating.   Denies fevers, chills, malaise, fatigue, and myalgia.     VS:   Vital Signs Last 24 Hrs  T(C): 36.7 (01 Aug 2020 00:00), Max: 36.8 (2020 20:30)  T(F): 98 (01 Aug 2020 00:00), Max: 98.2 (2020 20:30)  HR: 61 (01 Aug 2020 00:00) (61 - 76)  BP: 108/56 (01 Aug 2020 00:00) (11/71 - 108/56)  RR: 16 (01 Aug 2020 00:00) (16 - 16)  SpO2: 100% (01 Aug 2020 00:00) (98% - 100%)    Physical Exam:  General: NAD  Abdomen: soft, ND, firm fundus palpated at the umbilicus. Incision clean, dry, and intact.  Ext: nontender lower extremities, bilaterally.     Labs:                        10.9   9.31  )-----------( 165      ( 2020 08:06 )             33.7

## 2020-08-01 NOTE — DISCHARGE NOTE OB - HOSPITAL COURSE
41 y/o  now POD#2 s/p  section. Patient transferred to post partum unit, uncomplicated hospital course. At the time of discharge patient was tolerating regular diet PO, ambulating, voiding, and having bowel movements and flatus. Pain well controlled with pain medications PRN.

## 2020-08-01 NOTE — PROGRESS NOTE ADULT - ASSESSMENT
40y year old  now POD#2 s/p scheduled repeat  section at 39wks gestation, in stable condition.   - stable, meeting all post partum and post operative milestones   - RhoGAM to be given before discharge    - continue pain medications   - discharge today     d/w Dr. Fuentes

## 2020-08-01 NOTE — DISCHARGE NOTE OB - CARE PLAN
Principal Discharge DX:	 delivery delivered  Goal:	rapid recovery  Assessment and plan of treatment:	Patient can transition to regular activity level and continue regular diet. Patient should follow up with Dr. Simon office to schedule post-op follow up and incision check visits. Patient should contact doctor earlier should she develop persistent/increased vaginal bleeding or fever.

## 2020-08-04 DIAGNOSIS — Z3A.39 39 WEEKS GESTATION OF PREGNANCY: ICD-10-CM

## 2020-08-04 DIAGNOSIS — Z86.19 PERSONAL HISTORY OF OTHER INFECTIOUS AND PARASITIC DISEASES: ICD-10-CM

## 2020-08-04 DIAGNOSIS — Z34.83 ENCOUNTER FOR SUPERVISION OF OTHER NORMAL PREGNANCY, THIRD TRIMESTER: ICD-10-CM

## 2020-08-04 DIAGNOSIS — O34.212 MATERNAL CARE FOR VERTICAL SCAR FROM PREVIOUS CESAREAN DELIVERY: ICD-10-CM

## 2020-11-20 NOTE — PATIENT PROFILE OB - NS PRO PASSIVE SMOKE EXP
[facility] WEIGHT MANAGEMENT VIRTUAL ENCOUNTER     Duane Chiang verbally consents to a Virtual/Telephone Check-In service on 11/20/20   Patient understands and accepts financial responsibility for any deductible, co-insurance and/or co-pays ass PSYCH: denies any mood changes    Objective  EXAM  Reviewed most recent set of vitals- 118/74 HR on 9/2020  Physical Exam:  GENERAL: well developed, well nourished, in no apparent distress, speaking in full sentences comfortably   SKIN: warm, pink, dry wit •  Phentermine HCl 37.5 MG Oral Tab, Take 1 tablet (37.5 mg total) by mouth every morning before breakfast., Disp: 30 tablet, Rfl: 1    •  Azelastine HCl (ASTEPRO) 0.15 % Nasal Solution, 2 sprays by Nasal route daily. , Disp: 1 each, Rfl: 11    •  Nitrofura Class 1 obesity without serious comorbidity with body mass index (BMI) of 31.0 to 31.9 in adult, unspecified obesity type  See below    Vitamin B12 deficiency  Vitamin D deficiency  Continue with otc    Other orders  -     Topiramate ER (TROKENDI XR) 25 MG 2. Drink lots of water and cut down on soda/juice consumption if soda/juice drinker  3. Focus on protein: (15-30 grams with each meal) ie. greek yogurt, cottage cheese, string cheese, hard boiled eggs  4.  Healthy snacks: peanut butter and apples, hummus an -dried edamame   -prasanna seeds soaked in water or almond milk  -soy nuts  -cured meats (monitor for sodium issues)   -hummus with vegetables  -bean dip with vegetables     FRUIT  Low carb fruit options   Raspberries: Half a cup (60 grams) contains 3 grams of No

## 2020-12-11 NOTE — PATIENT PROFILE OB - PAIN SCALE PREFERRED, PROFILE
Post-Care Instructions: I reviewed with the patient in detail post-care instructions. Patient is to wear sunprotection, and avoid picking at any of the treated lesions. Pt may apply Vaseline to crusted or scabbing areas.
Number Of Freeze-Thaw Cycles: 1 freeze-thaw cycle
Detail Level: Simple
Render Note In Bullet Format When Appropriate: No
Duration Of Freeze Thaw-Cycle (Seconds): 10
Consent: The patient's verbal consent was obtained including but not limited to risks of crusting, scabbing, blistering, scarring, darker or lighter pigmentary change, recurrence, incomplete removal and infection.
Render Post-Care Instructions In Note?: yes
numerical 0-10

## 2021-08-12 ENCOUNTER — EMERGENCY (EMERGENCY)
Facility: HOSPITAL | Age: 41
LOS: 0 days | Discharge: HOME | End: 2021-08-13
Attending: HOSPITALIST
Payer: COMMERCIAL

## 2021-08-12 VITALS — HEIGHT: 64 IN | WEIGHT: 125 LBS

## 2021-08-12 DIAGNOSIS — Z23 ENCOUNTER FOR IMMUNIZATION: ICD-10-CM

## 2021-08-12 DIAGNOSIS — Z79.1 LONG TERM (CURRENT) USE OF NON-STEROIDAL ANTI-INFLAMMATORIES (NSAID): ICD-10-CM

## 2021-08-12 DIAGNOSIS — Z20.3 CONTACT WITH AND (SUSPECTED) EXPOSURE TO RABIES: ICD-10-CM

## 2021-08-12 PROCEDURE — 99284 EMERGENCY DEPT VISIT MOD MDM: CPT

## 2021-08-12 PROCEDURE — 99283 EMERGENCY DEPT VISIT LOW MDM: CPT | Mod: 25

## 2021-08-12 PROCEDURE — 90675 RABIES VACCINE IM: CPT | Mod: JG

## 2021-08-12 PROCEDURE — 90471 IMMUNIZATION ADMIN: CPT

## 2021-08-12 RX ORDER — RABIES VACC, HUMAN DIPLOID/PF 2.5 UNIT
1 VIAL (EA) INTRAMUSCULAR ONCE
Refills: 0 | Status: COMPLETED | OUTPATIENT
Start: 2021-08-12 | End: 2021-08-12

## 2021-08-12 RX ORDER — HUMAN RABIES VIRUS IMMUNE GLOBULIN 150 [IU]/ML
1150 INJECTION, SOLUTION INTRAMUSCULAR ONCE
Refills: 0 | Status: COMPLETED | OUTPATIENT
Start: 2021-08-12 | End: 2021-08-12

## 2021-08-12 RX ADMIN — Medication 1 MILLILITER(S): at 23:28

## 2021-08-12 RX ADMIN — HUMAN RABIES VIRUS IMMUNE GLOBULIN 1150 UNIT(S): 150 INJECTION, SOLUTION INTRAMUSCULAR at 23:34

## 2021-08-12 NOTE — ED ADULT TRIAGE NOTE - CHIEF COMPLAINT QUOTE
pt presents to ED s/p possible exposure to bat. Pt was closing umbrella last night, when possible bat flew near pts right hand. Now endorses right hand itching.

## 2021-08-13 VITALS
TEMPERATURE: 99 F | HEART RATE: 87 BPM | OXYGEN SATURATION: 100 % | SYSTOLIC BLOOD PRESSURE: 138 MMHG | RESPIRATION RATE: 18 BRPM | DIASTOLIC BLOOD PRESSURE: 82 MMHG

## 2021-08-13 NOTE — ED PROVIDER NOTE - NSFOLLOWUPINSTRUCTIONS_ED_ALL_ED_FT
Please return on day 3, 7 and 14 for remaining treatments. you will be called by the department of health for further information

## 2021-08-13 NOTE — ED PROVIDER NOTE - OBJECTIVE STATEMENT
41F p/w possible rabies exposure. patient was closing a patio umbrella last night and something flew out and landed on her hand. she shook it off and then later saw bats flying around her yard. no puncture wounds/bites.

## 2021-08-13 NOTE — ED ADULT NURSE NOTE - FINAL NURSING ELECTRONIC SIGNATURE
13-Aug-2021 00:57

## 2021-08-13 NOTE — ED PROVIDER NOTE - PATIENT PORTAL LINK FT
You can access the FollowMyHealth Patient Portal offered by [facility] by registering at the following website: [email or web address]. By joining [employer]’s FollowMyHealth portal, you will also be able to view your health information using other applications (apps) compatible with our system.

## 2021-08-15 ENCOUNTER — EMERGENCY (EMERGENCY)
Facility: HOSPITAL | Age: 41
LOS: 0 days | Discharge: HOME | End: 2021-08-15
Attending: STUDENT IN AN ORGANIZED HEALTH CARE EDUCATION/TRAINING PROGRAM
Payer: COMMERCIAL

## 2021-08-15 VITALS
TEMPERATURE: 99 F | OXYGEN SATURATION: 99 % | RESPIRATION RATE: 17 BRPM | DIASTOLIC BLOOD PRESSURE: 88 MMHG | SYSTOLIC BLOOD PRESSURE: 117 MMHG | HEART RATE: 98 BPM

## 2021-08-15 VITALS — WEIGHT: 125 LBS | HEIGHT: 64 IN

## 2021-08-15 DIAGNOSIS — Z29.14 ENCOUNTER FOR PROPHYLACTIC RABIES IMMUNE GLOBIN: ICD-10-CM

## 2021-08-15 DIAGNOSIS — Z20.3 CONTACT WITH AND (SUSPECTED) EXPOSURE TO RABIES: ICD-10-CM

## 2021-08-15 DIAGNOSIS — R53.83 OTHER FATIGUE: ICD-10-CM

## 2021-08-15 DIAGNOSIS — Z23 ENCOUNTER FOR IMMUNIZATION: ICD-10-CM

## 2021-08-15 PROCEDURE — 90675 RABIES VACCINE IM: CPT | Mod: JG

## 2021-08-15 PROCEDURE — 99284 EMERGENCY DEPT VISIT MOD MDM: CPT

## 2021-08-15 PROCEDURE — 99281 EMR DPT VST MAYX REQ PHY/QHP: CPT | Mod: 25

## 2021-08-15 PROCEDURE — 90471 IMMUNIZATION ADMIN: CPT

## 2021-08-15 RX ORDER — RABIES VACC, HUMAN DIPLOID/PF 2.5 UNIT
1 VIAL (EA) INTRAMUSCULAR ONCE
Refills: 0 | Status: COMPLETED | OUTPATIENT
Start: 2021-08-15 | End: 2021-08-15

## 2021-08-15 RX ADMIN — Medication 1 MILLILITER(S): at 12:56

## 2021-08-15 NOTE — ED STATDOCS - OBJECTIVE STATEMENT
42 y/o female presents to the ED for rabies follow-up. Pt is here for 3rd dose of rabies vaccine. States she originally encountered a bat that flew into her umbrella. States she will return in 1 week for 4th dose. 40 y/o female presents to the ED for rabies follow-up. Pt is here for 3rd dose of rabies vaccine. States she has been feeling slightly fatigued, but otherwise has no complaints. States last Wednesday, she was cranking her umbrella in her patio, when something flew down and grazed her hand. Pt was unsure if it was a bat, but had itching which continued the next day. States she saw 2 bats at her house the next day and had went to ED to get the rabies vaccine. Was concerned because she was nursing after the incident, but followed with a nurse at a call center who said there was no risk of transmission. States she will return in 1 week for 4th dose. 40 y/o female presents to the ED for rabies follow-up. Pt is here for 2nd dose of rabies vaccine. States she has been feeling slightly fatigued, but otherwise has no complaints. States last Wednesday, she was cranking her umbrella in her patio, when something flew down and grazed her hand. Pt was unsure if it was a bat, but had itching which continued the next day. States she saw 2 bats at her house the next day and had went to ED to get the rabies vaccine. Was concerned because she was nursing after the incident, but followed with a nurse at a call center who said there was no risk of transmission. States she will return in 1 week for 4th dose.

## 2021-08-15 NOTE — ED STATDOCS - ATTENDING CONTRIBUTION TO CARE
I, Shavonne Saavedra DO,  performed the initial face to face bedside interview with this patient regarding history of present illness, review of symptoms and relevant past medical, social and family history.  I completed an independent physical examination.  I was the initial provider who evaluated this patient. I have signed out the follow up of any pending tests (i.e. labs, radiological studies) to the ACP.  I have communicated the patient’s plan of care and disposition with the ACP.  The history, relevant review of systems, past medical and surgical history, medical decision making, and physical examination was documented by the scribe in my presence and I attest to the accuracy of the documentation.

## 2021-08-15 NOTE — ED STATDOCS - PATIENT PORTAL LINK FT
You can access the FollowMyHealth Patient Portal offered by [facility] by registering at the following website: [email or web address]. By joining [employer]’s FollowMyHealth portal, you will also be able to view your health information using other applications (apps) compatible with our system.

## 2021-08-15 NOTE — ED ADULT NURSE NOTE - OBJECTIVE STATEMENT
Pt presents to ER for second rabies vaccine. Pt denies any other complaints. AO x 3 oriented to baseline, normal breathing pattern with no difficulty.

## 2021-08-15 NOTE — ED STATDOCS - PROGRESS NOTE DETAILS
vaccine given, will d/c home with return for next vaccine   Ally Padron PA-C vaccine given, will d/c home with return for next vaccine on day 7   Ally Padron PA-C

## 2021-08-15 NOTE — ED STATDOCS - NSFOLLOWUPINSTRUCTIONS_ED_ALL_ED_FT
Rabies Vaccine    WHAT YOU NEED TO KNOW:    What is the rabies vaccine? The rabies vaccine is an injection given to help prevent infection from the virus that causes rabies. The rabies virus is spread to humans through the bite of an infected animal. Dogs, bats, skunks, coyotes, raccoons, and foxes are examples of animals that can carry rabies. The rabies vaccine can protect you from being infected with the virus. The vaccine can also prevent you from developing rabies even if you get it after you were bitten by an infected animal.    When is the vaccine given? Your healthcare provider will tell you how many doses of the vaccine you need. He or she will give you an injection schedule. Plan to get all of the doses on the days they are scheduled, especially the first 2 doses. Do not put off getting the injections or try to schedule them all for the same day. Tell your provider if you think anything may keep you from getting all the doses as scheduled. He or she may be able to help you find ways to stay on schedule. The following is a common dosing schedule:   •Before exposure to the virus, the vaccine is given in 3 doses. The first dose can be given at any time. The second dose is given 7 days after the first. The third dose is given 21 or 28 days after the first. Plan to get all of the doses 3 to 4 weeks before you travel.      •After exposure to the virus, the vaccine is usually given in 2 or 4 doses: ?A person who has not already had the vaccine will usually get 4 doses. The first dose is given as soon after exposure to rabies as possible. A shot called rabies immune globulin is given at the same time as the first dose. This medicine helps your immune system fight the infection. The other doses are given on days 3, 7, and 14 after the exposure. You may also need a dose 28 days after the exposure if you have a weak immune system. Your healthcare provider will tell you if you need 4 or 5 doses.      ?A person who has already had the vaccine will get 2 doses. The first is given immediately, and the second is given on day 3 after the exposure. Rabies immune globulin is not given.      •Booster doses may be needed over time if you stay at high risk for rabies. You are at increased risk for rabies if: ?Your work involves handling animals that can carry rabies.      ?You work in a rabies laboratory.      ?You often go into caves where bats live.      ?You often travel to a country where rabies is common.        What should I do if I miss a dose or will miss a scheduled dose? Call your healthcare provider right away. He or she will tell you what to do. The best way to be protected is to stay on the injection schedule given to you. This is especially important if you are getting the vaccine after exposure to the rabies virus. Reschedule any makeup dose or upcoming dose for as close to the original appointment as possible. Remember that you cannot get more than 1 dose on any day.    Who should not get the rabies vaccine or should wait to get it?   •Tell your healthcare provider if you had a severe allergic reaction to the rabies vaccine or to another vaccine. If you are getting the vaccine before exposure, do not get another dose. After exposure, you need to get all the doses even if you are at risk for an allergic reaction. Your healthcare provider may need to take extra precautions before you get another dose.      •Tell your provider about all of your allergies. Also tell him or her if you have a disease that affects your immune system (such as HIV/AIDS) or you have cancer. Tell him or her if you are taking medicines that affect your immune system or any cancer treatment drug or radiation. Tell him or her if you are ill. You may need to wait to get the vaccine until you feel better.      What are the risks of the rabies vaccine? You may have a severe allergic reaction. The area where you got the shot may become red, swollen, or painful. You may develop a headache or muscle aches. You may have nausea or pain in your abdomen. You may develop rabies even after you get the vaccine.    Call your local emergency number (911 in the [place]) or have someone else call if:   •Your mouth and throat are swollen.      •You are wheezing or have trouble breathing.      •You have chest pain or your heart is beating faster than normal for you.      •You feel like you are going to faint.      When should I seek immediate care?   •Your face is red or swollen.      •You have hives that spread over your body.      •You feel weak or dizzy.      When should I call my doctor?   •You have increased pain, redness, or swelling around the area where the shot was given.      •You have flu-like symptoms.      •You have questions or concerns about the rabies vaccine.      CARE AGREEMENT:    You have the right to help plan your care. Learn about your health condition and how it may be treated. Discuss treatment options with your healthcare providers to decide what care you want to receive. You always have the right to refuse treatment. Please return for third dose on day 7      Rabies Vaccine    WHAT YOU NEED TO KNOW:    What is the rabies vaccine? The rabies vaccine is an injection given to help prevent infection from the virus that causes rabies. The rabies virus is spread to humans through the bite of an infected animal. Dogs, bats, skunks, coyotes, raccoons, and foxes are examples of animals that can carry rabies. The rabies vaccine can protect you from being infected with the virus. The vaccine can also prevent you from developing rabies even if you get it after you were bitten by an infected animal.    When is the vaccine given? Your healthcare provider will tell you how many doses of the vaccine you need. He or she will give you an injection schedule. Plan to get all of the doses on the days they are scheduled, especially the first 2 doses. Do not put off getting the injections or try to schedule them all for the same day. Tell your provider if you think anything may keep you from getting all the doses as scheduled. He or she may be able to help you find ways to stay on schedule. The following is a common dosing schedule:   •Before exposure to the virus, the vaccine is given in 3 doses. The first dose can be given at any time. The second dose is given 7 days after the first. The third dose is given 21 or 28 days after the first. Plan to get all of the doses 3 to 4 weeks before you travel.      •After exposure to the virus, the vaccine is usually given in 2 or 4 doses: ?A person who has not already had the vaccine will usually get 4 doses. The first dose is given as soon after exposure to rabies as possible. A shot called rabies immune globulin is given at the same time as the first dose. This medicine helps your immune system fight the infection. The other doses are given on days 3, 7, and 14 after the exposure. You may also need a dose 28 days after the exposure if you have a weak immune system. Your healthcare provider will tell you if you need 4 or 5 doses.      ?A person who has already had the vaccine will get 2 doses. The first is given immediately, and the second is given on day 3 after the exposure. Rabies immune globulin is not given.      •Booster doses may be needed over time if you stay at high risk for rabies. You are at increased risk for rabies if: ?Your work involves handling animals that can carry rabies.      ?You work in a rabies laboratory.      ?You often go into caves where bats live.      ?You often travel to a country where rabies is common.        What should I do if I miss a dose or will miss a scheduled dose? Call your healthcare provider right away. He or she will tell you what to do. The best way to be protected is to stay on the injection schedule given to you. This is especially important if you are getting the vaccine after exposure to the rabies virus. Reschedule any makeup dose or upcoming dose for as close to the original appointment as possible. Remember that you cannot get more than 1 dose on any day.    Who should not get the rabies vaccine or should wait to get it?   •Tell your healthcare provider if you had a severe allergic reaction to the rabies vaccine or to another vaccine. If you are getting the vaccine before exposure, do not get another dose. After exposure, you need to get all the doses even if you are at risk for an allergic reaction. Your healthcare provider may need to take extra precautions before you get another dose.      •Tell your provider about all of your allergies. Also tell him or her if you have a disease that affects your immune system (such as HIV/AIDS) or you have cancer. Tell him or her if you are taking medicines that affect your immune system or any cancer treatment drug or radiation. Tell him or her if you are ill. You may need to wait to get the vaccine until you feel better.      What are the risks of the rabies vaccine? You may have a severe allergic reaction. The area where you got the shot may become red, swollen, or painful. You may develop a headache or muscle aches. You may have nausea or pain in your abdomen. You may develop rabies even after you get the vaccine.    Call your local emergency number (911 in the ) or have someone else call if:   •Your mouth and throat are swollen.      •You are wheezing or have trouble breathing.      •You have chest pain or your heart is beating faster than normal for you.      •You feel like you are going to faint.      When should I seek immediate care?   •Your face is red or swollen.      •You have hives that spread over your body.      •You feel weak or dizzy.      When should I call my doctor?   •You have increased pain, redness, or swelling around the area where the shot was given.      •You have flu-like symptoms.      •You have questions or concerns about the rabies vaccine.      CARE AGREEMENT:    You have the right to help plan your care. Learn about your health condition and how it may be treated. Discuss treatment options with your healthcare providers to decide what care you want to receive. You always have the right to refuse treatment.

## 2021-08-19 ENCOUNTER — EMERGENCY (EMERGENCY)
Facility: HOSPITAL | Age: 41
LOS: 0 days | Discharge: HOME | End: 2021-08-19
Attending: EMERGENCY MEDICINE
Payer: COMMERCIAL

## 2021-08-19 VITALS
HEART RATE: 55 BPM | HEIGHT: 64 IN | TEMPERATURE: 99 F | DIASTOLIC BLOOD PRESSURE: 78 MMHG | OXYGEN SATURATION: 99 % | RESPIRATION RATE: 18 BRPM | WEIGHT: 125 LBS | SYSTOLIC BLOOD PRESSURE: 146 MMHG

## 2021-08-19 DIAGNOSIS — Z20.3 CONTACT WITH AND (SUSPECTED) EXPOSURE TO RABIES: ICD-10-CM

## 2021-08-19 DIAGNOSIS — Z29.14 ENCOUNTER FOR PROPHYLACTIC RABIES IMMUNE GLOBIN: ICD-10-CM

## 2021-08-19 DIAGNOSIS — Z23 ENCOUNTER FOR IMMUNIZATION: ICD-10-CM

## 2021-08-19 DIAGNOSIS — M79.601 PAIN IN RIGHT ARM: ICD-10-CM

## 2021-08-19 PROCEDURE — 90675 RABIES VACCINE IM: CPT | Mod: JG

## 2021-08-19 PROCEDURE — 90471 IMMUNIZATION ADMIN: CPT

## 2021-08-19 PROCEDURE — 99281 EMR DPT VST MAYX REQ PHY/QHP: CPT | Mod: 25

## 2021-08-19 PROCEDURE — 99283 EMERGENCY DEPT VISIT LOW MDM: CPT

## 2021-08-19 RX ORDER — RABIES VACC, HUMAN DIPLOID/PF 2.5 UNIT
1 VIAL (EA) INTRAMUSCULAR ONCE
Refills: 0 | Status: COMPLETED | OUTPATIENT
Start: 2021-08-19 | End: 2021-08-19

## 2021-08-19 RX ADMIN — Medication 1 MILLILITER(S): at 21:24

## 2021-08-19 NOTE — ED STATDOCS - PROGRESS NOTE DETAILS
PA: Patient is a 42 y/o female with no pertinent PMHx who presents to [facility] for 3rd rabies vaccination following bat exposure 8 days ago. Pt c/o R arm pain however receives injection to R deltoid. Denies any other sx. Pt has been feeling anxious secondary to rabies exposure, no other concerns. DENIES fever. ~Kvng Peña PA-C PA note: Had a detailed at length conversation with patient regarding rabies exposure, symptoms and precautions, reassured patient. 3rd Rabies vaccine given today. Patient re-examined and re-evaluated. Patient feels much better at this time. ED evaluation, Diagnosis and management discussed with the patient in detail. Workup results discussed with ED attending, OK to dc home. Close PMD follow up encouraged.  Strict ED return instructions discussed in detail and patient given the opportunity to ask any questions about their discharge diagnosis and instructions. Patient verbalized understanding. ~ Kvng Peña PA-C

## 2021-08-19 NOTE — ED STATDOCS - SCRIBE NAME
Instructions for Acne Care    In the morning:   -Wash affected areas with Gentle cleanser: CeraVe hydrating facial Cleanser, CeraVe foaming facial cleanser, Neutrogena fresh foaming cleanser, Neutrogena ultra gentle daily cleanser, or Cetaphil gentle skin cleanser  -Apply CeraVe AM facial moisturizer for dry skin.    In the evening:  -Wash affected areas with Benzoyl peroxide 3-5% wash (face, chest, back)  -Apply Tazorac 0.05% gel a pea-sized amount diffusely to all affected areas.   -Apply CeraVe PM facial moisturizer for dry skin.    Medications by mouth:  -Take Minocycline 90mg once daily.  -Take with a full glass of water. Possible side effects include GI upset, headache, dizziness, dyspigmentation, drug-induced lupus, hepatotoxicity, hypersensitivity syndrome (fever, hepatitis and rash), and rare severe adverse drug reaction.            
Jazlyn Trujillo

## 2021-08-19 NOTE — ED STATDOCS - NSFOLLOWUPINSTRUCTIONS_ED_ALL_ED_FT
RABIES VACCINE - General Information           Rabies Vaccine    WHAT YOU NEED TO KNOW:    What is the rabies vaccine? The rabies vaccine is an injection given to help prevent infection from the virus that causes rabies. The rabies virus is spread to humans through the bite of an infected animal. Dogs, bats, skunks, coyotes, raccoons, and foxes are examples of animals that can carry rabies. The rabies vaccine can protect you from being infected with the virus. The vaccine can also prevent you from developing rabies even if you get it after you were bitten by an infected animal.    When is the vaccine given? Your healthcare provider will tell you how many doses of the vaccine you need. He or she will give you an injection schedule. Plan to get all of the doses on the days they are scheduled, especially the first 2 doses. Do not put off getting the injections or try to schedule them all for the same day. Tell your provider if you think anything may keep you from getting all the doses as scheduled. He or she may be able to help you find ways to stay on schedule. The following is a common dosing schedule:   •Before exposure to the virus, the vaccine is given in 3 doses. The first dose can be given at any time. The second dose is given 7 days after the first. The third dose is given 21 or 28 days after the first. Plan to get all of the doses 3 to 4 weeks before you travel.      •After exposure to the virus, the vaccine is usually given in 2 or 4 doses: ?A person who has not already had the vaccine will usually get 4 doses. The first dose is given as soon after exposure to rabies as possible. A shot called rabies immune globulin is given at the same time as the first dose. This medicine helps your immune system fight the infection. The other doses are given on days 3, 7, and 14 after the exposure. You may also need a dose 28 days after the exposure if you have a weak immune system. Your healthcare provider will tell you if you need 4 or 5 doses.      ?A person who has already had the vaccine will get 2 doses. The first is given immediately, and the second is given on day 3 after the exposure. Rabies immune globulin is not given.      •Booster doses may be needed over time if you stay at high risk for rabies. You are at increased risk for rabies if: ?Your work involves handling animals that can carry rabies.      ?You work in a rabies laboratory.      ?You often go into caves where bats live.      ?You often travel to a country where rabies is common.        What should I do if I miss a dose or will miss a scheduled dose? Call your healthcare provider right away. He or she will tell you what to do. The best way to be protected is to stay on the injection schedule given to you. This is especially important if you are getting the vaccine after exposure to the rabies virus. Reschedule any makeup dose or upcoming dose for as close to the original appointment as possible. Remember that you cannot get more than 1 dose on any day.    Who should not get the rabies vaccine or should wait to get it?   •Tell your healthcare provider if you had a severe allergic reaction to the rabies vaccine or to another vaccine. If you are getting the vaccine before exposure, do not get another dose. After exposure, you need to get all the doses even if you are at risk for an allergic reaction. Your healthcare provider may need to take extra precautions before you get another dose.      •Tell your provider about all of your allergies. Also tell him or her if you have a disease that affects your immune system (such as HIV/AIDS) or you have cancer. Tell him or her if you are taking medicines that affect your immune system or any cancer treatment drug or radiation. Tell him or her if you are ill. You may need to wait to get the vaccine until you feel better.      What are the risks of the rabies vaccine? You may have a severe allergic reaction. The area where you got the shot may become red, swollen, or painful. You may develop a headache or muscle aches. You may have nausea or pain in your abdomen. You may develop rabies even after you get the vaccine.    Call your local emergency number (911 in the [place]) or have someone else call if:   •Your mouth and throat are swollen.      •You are wheezing or have trouble breathing.      •You have chest pain or your heart is beating faster than normal for you.      •You feel like you are going to faint.      When should I seek immediate care?   •Your face is red or swollen.      •You have hives that spread over your body.      •You feel weak or dizzy.      When should I call my doctor?   •You have increased pain, redness, or swelling around the area where the shot was given.      •You have flu-like symptoms.      •You have questions or concerns about the rabies vaccine.      CARE AGREEMENT:    You have the right to help plan your care. Learn about your health condition and how it may be treated. Discuss treatment options with your healthcare providers to decide what care you want to receive. You always have the right to refuse treatment.               RABIES - General Information           Rabies    WHAT YOU NEED TO KNOW:    What is rabies? Rabies is a disease that affects the body's central nervous system (brain, spinal cord, and nerves). Rabies is caused by a virus. You may get the virus if you come into contact with the saliva or other tissue of an infected animal. Rabies infection usually happens through a bite wound. Animals that may spread rabies include dogs, cats, coyotes, raccoons, foxes, skunks, and bats. Rabies develops when the virus enters the skin and goes to the muscles or nerves. Without early treatment, rabies damages the brain and other organs. You may have brain swelling, seizures, and paralysis. Rabies can be life-threatening.    What increases my risk for rabies? Rabies can affect anyone, at any age. The following may increase your risk:  •You were bitten on the head, face, neck, or hands. Even a small bite from a bat can increase your risk for rabies.      •You were bitten many times during an attack, or you have deep bite wounds.      •You have a weak immune system from medicine such as steroids or a disease such as HIV/AIDS.      •You travel to places where rabies is common.      •You have a job that includes handling the virus or working with animals. These jobs include laboratory workers, veterinarians, forest rangers, and animal control and wildlife workers.      What are the early signs and symptoms of rabies? Signs and symptoms of rabies may appear weeks, months, or even years after the infection. During the early stages of rabies, you may feel like you have the flu. You may have one or more of the following for up to 10 days:  •Weakness, fever, headache, and irritability      •Loss of appetite, nausea, and vomiting      •Pain, numbness, or burning or tingling that may slowly spread to other areas      •Severe itching at the bite site      What are the late signs and symptoms of rabies? Over time, rabies may affect the brain. You may have any of the following:  •Confusion or insomnia       •Dizziness, seeing double, or seeing something that is not really there      •Restlessness, anxiety, and hyperactivity increased by thirst, fear, light, or noise      •Seizures or twitching      •Slurred speech, drooling, swallowing problems, and a fear of water      •Tiredness, muscle cramps, or trouble moving      •Severe weakness that may be only on one side of your body or face      How is rabies diagnosed? Your healthcare provider will ask if you have been bitten by an animal and how the animal behaved before it bit you. He or she will ask about vaccinations you have received, and your past travels. You may need any of the following tests:   •Blood tests are done to look for antibodies to the rabies virus. Antibodies are substances that the immune system makes to protect the body from outside organisms. You may need to have blood drawn more than 1 time.      •A biopsy is done to remove a small piece of skin to be tested for the cause of your symptoms. A skin sample is usually taken from the back of the neck.      •Cultures are done to test your saliva, tears, or the fluid around your brain and spinal cord.      •A lumbar puncture, or spinal tap, may be done to check the fluid around your brain and spinal cord for the rabies virus.      •A CT or MRI may be used to check for signs of swelling or infection in your brain.      How is rabies treated? The main goal of treatment is to prevent the virus from spreading inside the body. Treatment as soon as possible may prevent more serious problems and increase recovery.   •You may need to get the rabies vaccine. The rabies vaccine helps your body make antibodies to fight the virus. You will be given 2 doses if you received at least 1 dose before. You will be given 4 doses if you never received a dose. You may be given 5 doses if you never received a dose and you have a weak immune system.      •Rabies immune globulin (RIG) may be given. This medicine will attack the virus and help your immune system fight the infection. You will not be given RIG if you had at least 1 past rabies vaccine dose.      What can I do to prevent rabies?   •Ask your healthcare provider about the rabies vaccine. You may need the vaccine if your risk for rabies is increased through work or travel. You will be given 3 doses. Get all doses 3 to 4 weeks before you travel to a place where the risk for rabies is high.      •Avoid contact with animals. Do not approach any wild animal, or any tame animal that you do not know. Cover windows and other openings in your home with screens so wild animals cannot get inside.      •Get medical care if you get bitten by an animal. Do this even if the wound is very small.      •Get your pet vaccinated against rabies. You will need to do this every 3 years or as directed by your .      What should I do if an animal that can carry rabies bites me?   •Clean the bite wound. Clean the bite wound for at least 5 minutes. Use soap and water, or povidone-iodine solution mixed with water. Do this right after you are bitten to lower the risks for a wound infection and rabies. Cover the wound with a clean bandage to prevent infection.      •Seek care right away. Healthcare providers may need to treat the wound and close it with stitches. You may need to take antibiotics to help fight or treat a bacterial infection. The rabies vaccine series may be started immediately.      Call your local emergency number (911 in the ) or have someone else call if:   •You have trouble swallowing or slurred speech.      •You have double vision, or you see things that are not really there.      •You begin twitching, have muscle cramps, or have a seizure.      When should I seek immediate care?   •You think you were exposed to rabies.      •You were bitten by an animal. Clean the wound as soon after the bite as possible.      •You feel weak, tired, dizzy, confused, restless, or anxious.      When should I call my doctor?   •You have a fever.      •Your signs and symptoms do not get better after treatment.      •You have questions or concerns about rabies and rabies treatment.      CARE AGREEMENT:    You have the right to help plan your care. Learn about your health condition and how it may be treated. Discuss treatment options with your healthcare providers to decide what care you want to receive. You always have the right to refuse treatment.

## 2021-08-19 NOTE — ED STATDOCS - CLINICAL SUMMARY MEDICAL DECISION MAKING FREE TEXT BOX
PA note: Had a detailed at length conversation with patient regarding rabies exposure, symptoms and precautions, reassured patient. 3rd Rabies vaccine given today. Patient re-examined and re-evaluated. Patient feels much better at this time. ED evaluation, Diagnosis and management discussed with the patient in detail. Workup results discussed with ED attending, OK to dc home. Close PMD follow up encouraged.  Strict ED return instructions discussed in detail and patient given the opportunity to ask any questions about their discharge diagnosis and instructions. Patient verbalized understanding. ~ Kvng Peña PA-C

## 2021-08-19 NOTE — ED STATDOCS - OBJECTIVE STATEMENT
40 y/o female with no pertinent PMHx, presents to the ED for 3rd rabies vaccination following bat exposure. Pt c/o R arm pain however receives injection to R deltoid. Denies any other sx. Pt has been feeling anxious secondary to rabies exposure, no other concerns.

## 2021-08-19 NOTE — ED STATDOCS - PHYSICAL EXAMINATION
PA NOTE: GEN: AOX3, NAD. HEENT: Throat clear. Airway is patent. EYES: PERRLA. EOMI. Head: NC/AT. NECK: Supple, No JVD. FROM. C-spine non-tender. CV:S1S2, RRR, LUNGS: Non-labored breathing, no tachypnea. O2sat 100% RA. CTA b/l. No w/r/r. CHEST: Equal chest expansion and rise. No deformity. ABD: Soft, NT/ND, no rebound, no guarding. No CVAT. EXT: No e/c/c. 2+ distal pulses. SKIN: No rashes. NEURO: No focal deficits. CN II-XII intact. FROM. 5/5 motor and sensory. ~Kvng Peña PA-C

## 2021-08-19 NOTE — ED STATDOCS - CARE PLAN
1 Principal Discharge DX:	Exposure to rabies  Secondary Diagnosis:	Need for post exposure prophylaxis for rabies

## 2021-08-19 NOTE — ED STATDOCS - PATIENT PORTAL LINK FT
You can access the FollowMyHealth Patient Portal offered by [facility] by registering at the following website: [email or web address]. By joining [employer]’s FollowMyHealth portal, you will also be able to view your health information using other applications (apps) compatible with our system.

## 2021-08-20 PROBLEM — Z78.9 OTHER SPECIFIED HEALTH STATUS: Chronic | Status: ACTIVE | Noted: 2021-08-15

## 2021-08-26 ENCOUNTER — EMERGENCY (EMERGENCY)
Facility: HOSPITAL | Age: 41
LOS: 0 days | Discharge: HOME | End: 2021-08-26
Attending: STUDENT IN AN ORGANIZED HEALTH CARE EDUCATION/TRAINING PROGRAM
Payer: COMMERCIAL

## 2021-08-26 VITALS
TEMPERATURE: 99 F | OXYGEN SATURATION: 95 % | DIASTOLIC BLOOD PRESSURE: 86 MMHG | RESPIRATION RATE: 17 BRPM | SYSTOLIC BLOOD PRESSURE: 140 MMHG | HEART RATE: 110 BPM

## 2021-08-26 VITALS — WEIGHT: 130.07 LBS | HEIGHT: 64 IN

## 2021-08-26 DIAGNOSIS — Z20.3 CONTACT WITH AND (SUSPECTED) EXPOSURE TO RABIES: ICD-10-CM

## 2021-08-26 DIAGNOSIS — Z29.14 ENCOUNTER FOR PROPHYLACTIC RABIES IMMUNE GLOBIN: ICD-10-CM

## 2021-08-26 PROCEDURE — 90675 RABIES VACCINE IM: CPT | Mod: JG

## 2021-08-26 PROCEDURE — 96372 THER/PROPH/DIAG INJ SC/IM: CPT

## 2021-08-26 PROCEDURE — 99283 EMERGENCY DEPT VISIT LOW MDM: CPT

## 2021-08-26 PROCEDURE — 99281 EMR DPT VST MAYX REQ PHY/QHP: CPT | Mod: 25

## 2021-08-26 RX ORDER — RABIES VACC, HUMAN DIPLOID/PF 2.5 UNIT
1 VIAL (EA) INTRAMUSCULAR ONCE
Refills: 0 | Status: COMPLETED | OUTPATIENT
Start: 2021-08-26 | End: 2021-08-26

## 2021-08-26 RX ADMIN — Medication 1 MILLILITER(S): at 11:57

## 2021-08-26 NOTE — ED STATDOCS - CLINICAL SUMMARY MEDICAL DECISION MAKING FREE TEXT BOX
final rabies vaccine final rabies vaccine    DILLON Juan, Attending: last rabies vaccine for exposure on 8/13/21. No other issues.

## 2021-08-26 NOTE — ED STATDOCS - PHYSICAL EXAMINATION
General: alert, conversant, looks well, vitals reassuring  Head: atraumatic, normocephalic  Eyes: EOMI, no scleral icterus  ENT: no epistaxis, normal phonation, airway patent  Neck: full ROM  CV: well perfused, distal pulses intact, RRR  Pulm: no respiratory distress  GI: no distension   Back: normal ROM, no signs of trauma  Extremities: normal ROM, joints stable, distal pulses intact, no edema  Neuro: alert, oriented x3, moving all extremities, interactive, normal speech/memory/gait   Derm: warm, dry, normal color, no rash/wounds

## 2021-08-26 NOTE — ED STATDOCS - NSFOLLOWUPINSTRUCTIONS_ED_ALL_ED_FT
Rabies Vaccine    WHAT YOU NEED TO KNOW:    What is the rabies vaccine? The rabies vaccine is an injection given to help prevent infection from the virus that causes rabies. The rabies virus is spread to humans through the bite of an infected animal. Dogs, bats, skunks, coyotes, raccoons, and foxes are examples of animals that can carry rabies. The rabies vaccine can protect you from being infected with the virus. The vaccine can also prevent you from developing rabies even if you get it after you were bitten by an infected animal.    When is the vaccine given? Your healthcare provider will tell you how many doses of the vaccine you need. He or she will give you an injection schedule. Plan to get all of the doses on the days they are scheduled, especially the first 2 doses. Do not put off getting the injections or try to schedule them all for the same day. Tell your provider if you think anything may keep you from getting all the doses as scheduled. He or she may be able to help you find ways to stay on schedule. The following is a common dosing schedule:     Before exposure to the virus, the vaccine is given in 3 doses. The first dose can be given at any time. The second dose is given 7 days after the first. The third dose is given 21 or 28 days after the first. Plan to get all of the doses 3 to 4 weeks before you travel.      After exposure to the virus, the vaccine is usually given in 2 or 4 doses:   A person who has not already had the vaccine will usually get 4 doses. The first dose is given as soon after exposure to rabies as possible. A shot called rabies immune globulin is given at the same time as the first dose. This medicine helps your immune system fight the infection. The other doses are given on days 3, 7, and 14 after the exposure. You may also need a dose 28 days after the exposure if you have a weak immune system. Your healthcare provider will tell you if you need 4 or 5 doses.      A person who has already had the vaccine will get 2 doses. The first is given immediately, and the second is given on day 3 after the exposure. Rabies immune globulin is not given.      Booster doses may be needed over time if you stay at high risk for rabies. You are at increased risk for rabies if:   Your work involves handling animals that can carry rabies.      You work in a rabies laboratory.      You often go into caves where bats live.      You often travel to a country where rabies is common.    What should I do if I miss a dose or will miss a scheduled dose? Call your healthcare provider right away. He or she will tell you what to do. The best way to be protected is to stay on the injection schedule given to you. This is especially important if you are getting the vaccine after exposure to the rabies virus. Reschedule any makeup dose or upcoming dose for as close to the original appointment as possible. Remember that you cannot get more than 1 dose on any day.    Who should not get the rabies vaccine or should wait to get it?     Tell your healthcare provider if you had a severe allergic reaction to the rabies vaccine or to another vaccine. If you are getting the vaccine before exposure, do not get another dose. After exposure, you need to get all the doses even if you are at risk for an allergic reaction. Your healthcare provider may need to take extra precautions before you get another dose.      Tell your provider about all of your allergies. Also tell him or her if you have a disease that affects your immune system (such as HIV/AIDS) or you have cancer. Tell him or her if you are taking medicines that affect your immune system or any cancer treatment drug or radiation. Tell him or her if you are ill. You may need to wait to get the vaccine until you feel better.    What are the risks of the rabies vaccine? You may have a severe allergic reaction. The area where you got the shot may become red, swollen, or painful. You may develop a headache or muscle aches. You may have nausea or pain in your abdomen. You may develop rabies even after you get the vaccine.    Call your local emergency number (911 in the [place]) or have someone else call if:     Your mouth and throat are swollen.      You are wheezing or have trouble breathing.      You have chest pain or your heart is beating faster than normal for you.      You feel like you are going to faint.    When should I seek immediate care?     Your face is red or swollen.      You have hives that spread over your body.      You feel weak or dizzy.    When should I call my doctor?     You have increased pain, redness, or swelling around the area where the shot was given.      You have flu-like symptoms.      You have questions or concerns about the rabies vaccine.    CARE AGREEMENT:    You have the right to help plan your care. Learn about your health condition and how it may be treated. Discuss treatment options with your healthcare providers to decide what care you want to receive. You always have the right to refuse treatment.

## 2021-08-26 NOTE — ED STATDOCS - OBJECTIVE STATEMENT
41 yoF, presenting for rabies vaccines series. Exposure on 8/13. No other issues. No new sx. States has been feeling anxious since incident.

## 2021-08-26 NOTE — ED STATDOCS - PROGRESS NOTE DETAILS
signed Neva Morales PA-C Pt seen initially in intake by Dr Juan.   41F here for final rabies vaccine after possible bat exposure on 8/12. pt feeling well but is very anxious right now because in the waiting room there was someone who said they were COVID-19 + and she wants to leave as soon as possible, hence the mild tachycardia. Plan vaccine, DC. Pt agrees with plan of  care.

## 2021-08-26 NOTE — ED STATDOCS - PATIENT PORTAL LINK FT
You can access the FollowMyHealth Patient Portal offered by [facility] by registering at the following website: [email or web address]. By joining "[employer]’s FollowMyHealth portal, you will also be able to view your health information using other applications (apps) compatible with our system.

## 2021-08-26 NOTE — ED STATDOCS - CARE PLAN
Statement Selected 1 Principal Discharge DX:	Exposure to bat without known bite  Secondary Diagnosis:	Need for post exposure prophylaxis for rabies

## 2021-09-22 ENCOUNTER — TRANSCRIPTION ENCOUNTER (OUTPATIENT)
Age: 41
End: 2021-09-22

## 2021-10-08 NOTE — PATIENT PROFILE OB - TEACHING/LEARNING FACTORS INFLUENCE READINESS TO LEARN
none Alternatives Discussed Intro (Do Not Add Period): I discussed alternative treatments to Mohs surgery and specifically discussed the risks and benefits of

## 2021-11-30 ENCOUNTER — APPOINTMENT (OUTPATIENT)
Dept: OTOLARYNGOLOGY | Facility: CLINIC | Age: 41
End: 2021-11-30
Payer: COMMERCIAL

## 2021-11-30 VITALS
HEIGHT: 64 IN | WEIGHT: 125 LBS | DIASTOLIC BLOOD PRESSURE: 81 MMHG | HEART RATE: 85 BPM | BODY MASS INDEX: 21.34 KG/M2 | TEMPERATURE: 98.4 F | SYSTOLIC BLOOD PRESSURE: 117 MMHG

## 2021-11-30 DIAGNOSIS — S16.1XXA STRAIN OF MUSCLE, FASCIA AND TENDON AT NECK LEVEL, INITIAL ENCOUNTER: ICD-10-CM

## 2021-11-30 DIAGNOSIS — Z80.9 FAMILY HISTORY OF MALIGNANT NEOPLASM, UNSPECIFIED: ICD-10-CM

## 2021-11-30 DIAGNOSIS — H92.02 OTALGIA, LEFT EAR: ICD-10-CM

## 2021-11-30 DIAGNOSIS — Z78.9 OTHER SPECIFIED HEALTH STATUS: ICD-10-CM

## 2021-11-30 DIAGNOSIS — M26.609 UNSPECIFIED TEMPOROMANDIBULAR JOINT DISORDER: ICD-10-CM

## 2021-11-30 DIAGNOSIS — Z82.49 FAMILY HISTORY OF ISCHEMIC HEART DISEASE AND OTHER DISEASES OF THE CIRCULATORY SYSTEM: ICD-10-CM

## 2021-11-30 PROCEDURE — 99203 OFFICE O/P NEW LOW 30 MIN: CPT

## 2021-11-30 NOTE — HISTORY OF PRESENT ILLNESS
[de-identified] : left ear pain past 2 days\par hearing ok, no tinnitus\par neg hx re ears\par neg hx tmj

## 2021-11-30 NOTE — ASSESSMENT
[FreeTextEntry1] : exam unremarkable\par unclear etiology for ear pain\par tmj vs eust tube vs cervical strain\par rec heat motrin, soft diet trial\par consider dental exam

## 2022-02-15 NOTE — ED ADULT TRIAGE NOTE - NS ED NURSE BANDS TYPE
Skilled nurse visit for cp assessment and t/i on disease management r/t Covid, UTI. Patient sitting in recliner with legs up. Patient has compression stockings in place. Daughter present. Patient has appointment with MD on Monday 21th for f/u and medication reconcilation. Daughter stated she Norvasc d/t patient b/p WNL and SNF started patient on medication and b/p was wnl in SNF.  Patient and CG have no further questions and left in good condition
Name band;

## 2023-02-04 ENCOUNTER — NON-APPOINTMENT (OUTPATIENT)
Age: 43
End: 2023-02-04

## 2023-03-23 ENCOUNTER — APPOINTMENT (OUTPATIENT)
Dept: OBGYN | Facility: CLINIC | Age: 43
End: 2023-03-23

## 2023-06-06 ENCOUNTER — APPOINTMENT (OUTPATIENT)
Dept: OBGYN | Facility: CLINIC | Age: 43
End: 2023-06-06

## 2023-07-27 ENCOUNTER — NON-APPOINTMENT (OUTPATIENT)
Age: 43
End: 2023-07-27

## 2023-07-31 ENCOUNTER — APPOINTMENT (OUTPATIENT)
Dept: DERMATOLOGY | Facility: CLINIC | Age: 43
End: 2023-07-31

## 2023-08-14 ENCOUNTER — NON-APPOINTMENT (OUTPATIENT)
Age: 43
End: 2023-08-14

## 2023-08-14 ENCOUNTER — APPOINTMENT (OUTPATIENT)
Dept: DERMATOLOGY | Facility: CLINIC | Age: 43
End: 2023-08-14
Payer: COMMERCIAL

## 2023-08-14 DIAGNOSIS — D22.9 MELANOCYTIC NEVI, UNSPECIFIED: ICD-10-CM

## 2023-08-14 DIAGNOSIS — D23.9 OTHER BENIGN NEOPLASM OF SKIN, UNSPECIFIED: ICD-10-CM

## 2023-08-14 PROCEDURE — 99203 OFFICE O/P NEW LOW 30 MIN: CPT

## 2023-08-14 RX ORDER — OFLOXACIN OTIC 3 MG/ML
0.3 SOLUTION AURICULAR (OTIC) TWICE DAILY
Qty: 1 | Refills: 3 | Status: DISCONTINUED | COMMUNITY
Start: 2021-11-30 | End: 2023-08-14

## 2023-08-14 NOTE — ASSESSMENT
[FreeTextEntry1] : A complete skin examination was performed.  There is no evidence of skin cancer.  We discussed the importance of photoprotection, including the use of hats, protective clothing and sunscreens with an SPF of at least 30.  Sun avoidance was also discussed.  The ABCDE's of melanoma was discussed.  Regular skin exams recommended.  DF - left thigh. benign.  Benign pigmented nevi, blue nevus. Education. Patient to consider removal of nevus of the right lateral breast.  Discussed.

## 2023-08-14 NOTE — PHYSICAL EXAM
[Alert] : alert [Oriented x 3] : ~L oriented x 3 [Well Nourished] : well nourished [Full Body Skin Exam Performed] : performed [FreeTextEntry3] : A full skin exam was performed including the scalp, face, neck, chest, abdomen, back, buttocks, upper extremities and lower extremities.  The patient declined examination of the breasts and genitalia.   The exam did show the following benign growths: Cascade pigmented nevi - numerous.  Includes 2 cm pedunculated plaque of the right lateral breast. Also includes bluish 3 mm macule of the right anterior shoulder.  ELM uniform and regular.  Hyperpigmented erythematous barely elevated papule with positive dimple sign, c/w a dermatofibroma on the left lateral thigh.

## 2023-08-14 NOTE — HISTORY OF PRESENT ILLNESS
[FreeTextEntry1] : Patient presents for skin examination. [de-identified] : Notes lesion on the right lateral breast, present for years, with some growth over years.

## 2023-09-11 ENCOUNTER — APPOINTMENT (OUTPATIENT)
Dept: DERMATOLOGY | Facility: CLINIC | Age: 43
End: 2023-09-11

## 2023-10-11 ENCOUNTER — APPOINTMENT (OUTPATIENT)
Dept: BREAST CENTER | Facility: CLINIC | Age: 43
End: 2023-10-11

## 2023-11-14 NOTE — PATIENT PROFILE OB - PATERNAL EMPLOYMENT SHIFT, OB PROFILE
Bed/Stretcher in lowest position, wheels locked, appropriate side rails in place/Call bell, personal items and telephone in reach/Instruct patient to call for assistance before getting out of bed/chair/stretcher/Non-slip footwear applied when patient is off stretcher/[place] to call system/Physically safe environment - no spills, clutter or unnecessary equipment/Purposeful proactive rounding/Room/bathroom lighting operational, light cord in reach
day

## 2024-02-01 ENCOUNTER — NON-APPOINTMENT (OUTPATIENT)
Age: 44
End: 2024-02-01

## 2024-03-07 ENCOUNTER — APPOINTMENT (OUTPATIENT)
Dept: OBGYN | Facility: CLINIC | Age: 44
End: 2024-03-07

## 2024-03-13 ENCOUNTER — APPOINTMENT (OUTPATIENT)
Dept: GASTROENTEROLOGY | Facility: CLINIC | Age: 44
End: 2024-03-13

## 2024-03-16 NOTE — PATIENT PROFILE OB - BREASTFEEDING PROVIDES STABLE TEMPERATURE THROUGH SKIN TO SKIN CONTACT
Again attempted to contact sonMayito. Left message to call back at [phone]  
abscess
Statement Selected

## 2024-04-07 PROBLEM — Z12.11 COLON CANCER SCREENING: Status: ACTIVE | Noted: 2024-04-07

## 2024-04-07 PROBLEM — Z12.4 CERVICAL CANCER SCREENING: Status: ACTIVE | Noted: 2024-04-07

## 2024-04-11 ENCOUNTER — APPOINTMENT (OUTPATIENT)
Dept: OBGYN | Facility: CLINIC | Age: 44
End: 2024-04-11
Payer: COMMERCIAL

## 2024-04-11 VITALS
SYSTOLIC BLOOD PRESSURE: 134 MMHG | DIASTOLIC BLOOD PRESSURE: 81 MMHG | HEART RATE: 76 BPM | WEIGHT: 118 LBS | BODY MASS INDEX: 20.25 KG/M2

## 2024-04-11 DIAGNOSIS — Z82.49 FAMILY HISTORY OF ISCHEMIC HEART DISEASE AND OTHER DISEASES OF THE CIRCULATORY SYSTEM: ICD-10-CM

## 2024-04-11 DIAGNOSIS — R10.2 PELVIC AND PERINEAL PAIN: ICD-10-CM

## 2024-04-11 DIAGNOSIS — Z12.11 ENCOUNTER FOR SCREENING FOR MALIGNANT NEOPLASM OF COLON: ICD-10-CM

## 2024-04-11 DIAGNOSIS — Z12.4 ENCOUNTER FOR SCREENING FOR MALIGNANT NEOPLASM OF CERVIX: ICD-10-CM

## 2024-04-11 DIAGNOSIS — Z12.39 ENCOUNTER FOR OTHER SCREENING FOR MALIGNANT NEOPLASM OF BREAST: ICD-10-CM

## 2024-04-11 DIAGNOSIS — Z80.3 FAMILY HISTORY OF MALIGNANT NEOPLASM OF BREAST: ICD-10-CM

## 2024-04-11 DIAGNOSIS — Z80.42 FAMILY HISTORY OF MALIGNANT NEOPLASM OF PROSTATE: ICD-10-CM

## 2024-04-11 DIAGNOSIS — Z00.00 ENCOUNTER FOR GENERAL ADULT MEDICAL EXAMINATION W/OUT ABNORMAL FINDINGS: ICD-10-CM

## 2024-04-11 LAB
BILIRUB UR QL STRIP: NEGATIVE
CLARITY UR: CLEAR
COLLECTION METHOD: NORMAL
GLUCOSE UR-MCNC: NEGATIVE
HCG UR QL: 0 EU/DL
HEMOGLOBIN: 13.9
HGB UR QL STRIP.AUTO: NORMAL
KETONES UR-MCNC: NEGATIVE
LEUKOCYTE ESTERASE UR QL STRIP: NEGATIVE
NITRITE UR QL STRIP: NEGATIVE
PH UR STRIP: 7
PROT UR STRIP-MCNC: NEGATIVE
SP GR UR STRIP: 1

## 2024-04-11 PROCEDURE — 85018 HEMOGLOBIN: CPT | Mod: QW

## 2024-04-11 PROCEDURE — 82270 OCCULT BLOOD FECES: CPT

## 2024-04-11 PROCEDURE — 81003 URINALYSIS AUTO W/O SCOPE: CPT | Mod: QW

## 2024-04-11 PROCEDURE — 99386 PREV VISIT NEW AGE 40-64: CPT

## 2024-04-11 RX ORDER — ZINC OXIDE 13 %
CREAM (GRAM) TOPICAL
Refills: 0 | Status: ACTIVE | COMMUNITY

## 2024-04-11 RX ORDER — MULTIVIT-MIN/IRON/FOLIC ACID/K 18-600-40
CAPSULE ORAL
Refills: 0 | Status: ACTIVE | COMMUNITY

## 2024-04-11 RX ORDER — THIAMINE HCL 50 MG
TABLET ORAL
Refills: 0 | Status: ACTIVE | COMMUNITY

## 2024-04-11 RX ORDER — MULTIVITAMIN
TABLET ORAL
Refills: 0 | Status: ACTIVE | COMMUNITY

## 2024-04-11 NOTE — PLAN
[FreeTextEntry1] :  Patient to follow up in 1 year for annual GYN exam Mammogram: now rx given  Colonoscopy due:  now referred to [facility]  Bone density due:  pm   Discussed tx for ovulation pain would be OCPs to suppress ovulation, patient declines at this time.  referral for pelvic floor PT for pelvic pain given.    Pap ordered Hemoccult ordered  All questions answered, patient agreeable with plan.

## 2024-04-11 NOTE — HISTORY OF PRESENT ILLNESS
[FreeTextEntry1] : Patient is a 43 yo female here today for annual visit. Her periods are regular. condoms for birth control. she complains of ovulation pain in which she had a sonogram last year and was normal.    s/p 2 c sections   hx of abnormal pap treated with cryo in the past.   hx of appendectomy .  paternal grandmother breast cancer paternal grandfather and father prostate cancer.

## 2024-04-11 NOTE — PHYSICAL EXAM
[Appropriately responsive] : appropriately responsive [Alert] : alert [No Acute Distress] : no acute distress [No Lymphadenopathy] : no lymphadenopathy [Soft] : soft [Non-tender] : non-tender [Non-distended] : non-distended [No HSM] : No HSM [No Lesions] : no lesions [No Mass] : no mass [Oriented x3] : oriented x3 [Examination Of The Breasts] : a normal appearance [No Discharge] : no discharge [No Masses] : no breast masses were palpable [Labia Majora] : normal [Labia Minora] : normal [Moderate] : There was moderate vaginal bleeding [Normal] : normal [Uterine Adnexae] : normal [Normal rectal exam] : was normal [Occult Blood Positive] : was negative for occult blood analysis

## 2024-04-15 LAB — CYTOLOGY CVX/VAG DOC THIN PREP: NORMAL

## 2024-08-22 ENCOUNTER — APPOINTMENT (OUTPATIENT)
Dept: DERMATOLOGY | Facility: CLINIC | Age: 44
End: 2024-08-22
Payer: COMMERCIAL

## 2024-08-22 DIAGNOSIS — D23.9 OTHER BENIGN NEOPLASM OF SKIN, UNSPECIFIED: ICD-10-CM

## 2024-08-22 DIAGNOSIS — D22.9 MELANOCYTIC NEVI, UNSPECIFIED: ICD-10-CM

## 2024-08-22 DIAGNOSIS — Z80.3 FAMILY HISTORY OF MALIGNANT NEOPLASM OF BREAST: ICD-10-CM

## 2024-08-22 DIAGNOSIS — Z80.42 FAMILY HISTORY OF MALIGNANT NEOPLASM OF PROSTATE: ICD-10-CM

## 2024-08-22 DIAGNOSIS — Z82.49 FAMILY HISTORY OF ISCHEMIC HEART DISEASE AND OTHER DISEASES OF THE CIRCULATORY SYSTEM: ICD-10-CM

## 2024-08-22 DIAGNOSIS — D18.01 HEMANGIOMA OF SKIN AND SUBCUTANEOUS TISSUE: ICD-10-CM

## 2024-08-22 DIAGNOSIS — I78.1 NEVUS, NON-NEOPLASTIC: ICD-10-CM

## 2024-08-22 PROCEDURE — 99214 OFFICE O/P EST MOD 30 MIN: CPT

## 2024-08-22 NOTE — HISTORY OF PRESENT ILLNESS
[FreeTextEntry1] : Patient presents for skin examination. [de-identified] : Notes lesion on the right lateral breast, with irritation intermittently.  No bleeding or prior tx.

## 2024-08-22 NOTE — PHYSICAL EXAM
[Alert] : alert [Oriented x 3] : ~L oriented x 3 [Well Nourished] : well nourished [Full Body Skin Exam Performed] : performed [FreeTextEntry3] : A full skin exam was performed including the scalp, face (including the lips, ears, nose and eyes), neck, chest, breasts, abdomen, back, buttocks, upper extremities and lower extremities.  The patient declined examination of the genitalia.   The exam revealed the following benign growths: [place] pigmented nevi - TNTC, includes pedunculated nodule, right lateral breast, 2.5 cm. 3 mm blue macule of the right shoulder, c/w blue nevus. Cherry angioma - numerous on the trunk. Hyperpigmented erythematous barely elevated papule with positive dimple sign, c/w a dermatofibroma on the left posterior thigh.  Fine telangiectasias of the bilateral malar region.

## 2024-09-13 NOTE — DISCHARGE NOTE OB - FUNCTIONAL SCREEN CURRENT LEVEL: BATHING, MLM
well developed, well nourished , in no acute distress , ambulating without difficulty , normal communication ability
0 = independent

## 2025-02-10 ENCOUNTER — APPOINTMENT (OUTPATIENT)
Dept: OBGYN | Facility: CLINIC | Age: 45
End: 2025-02-10
Payer: COMMERCIAL

## 2025-02-10 DIAGNOSIS — Z12.39 ENCOUNTER FOR OTHER SCREENING FOR MALIGNANT NEOPLASM OF BREAST: ICD-10-CM

## 2025-02-10 DIAGNOSIS — R10.2 PELVIC AND PERINEAL PAIN: ICD-10-CM

## 2025-02-10 PROCEDURE — 99459 PELVIC EXAMINATION: CPT

## 2025-02-10 PROCEDURE — 99203 OFFICE O/P NEW LOW 30 MIN: CPT

## 2025-02-13 ENCOUNTER — NON-APPOINTMENT (OUTPATIENT)
Age: 45
End: 2025-02-13

## 2025-02-13 ENCOUNTER — APPOINTMENT (OUTPATIENT)
Dept: OBGYN | Facility: CLINIC | Age: 45
End: 2025-02-13

## 2025-02-17 ENCOUNTER — NON-APPOINTMENT (OUTPATIENT)
Age: 45
End: 2025-02-17

## 2025-03-20 ENCOUNTER — NON-APPOINTMENT (OUTPATIENT)
Age: 45
End: 2025-03-20

## 2025-04-09 PROBLEM — Z01.419 ENCOUNTER FOR ANNUAL ROUTINE GYNECOLOGICAL EXAMINATION: Status: ACTIVE | Noted: 2025-04-09

## 2025-04-16 ENCOUNTER — APPOINTMENT (OUTPATIENT)
Dept: OBGYN | Facility: CLINIC | Age: 45
End: 2025-04-16

## 2025-04-16 DIAGNOSIS — Z01.419 ENCOUNTER FOR GYNECOLOGICAL EXAMINATION (GENERAL) (ROUTINE) W/OUT ABNORMAL FINDINGS: ICD-10-CM

## 2025-05-20 ENCOUNTER — APPOINTMENT (OUTPATIENT)
Dept: OBGYN | Facility: CLINIC | Age: 45
End: 2025-05-20

## 2025-05-20 ENCOUNTER — RESULT CHARGE (OUTPATIENT)
Age: 45
End: 2025-05-20

## 2025-05-20 VITALS
HEIGHT: 64 IN | HEART RATE: 88 BPM | DIASTOLIC BLOOD PRESSURE: 80 MMHG | WEIGHT: 117 LBS | BODY MASS INDEX: 19.97 KG/M2 | SYSTOLIC BLOOD PRESSURE: 146 MMHG

## 2025-05-20 DIAGNOSIS — Z01.419 ENCOUNTER FOR GYNECOLOGICAL EXAMINATION (GENERAL) (ROUTINE) W/OUT ABNORMAL FINDINGS: ICD-10-CM

## 2025-05-20 LAB
BILIRUB UR QL STRIP: NEGATIVE
CLARITY UR: CLEAR
COLLECTION METHOD: NORMAL
GLUCOSE UR-MCNC: NEGATIVE
HCG UR QL: 0 EU/DL
HEMOGLOBIN: 14.3
HGB UR QL STRIP.AUTO: NEGATIVE
KETONES UR-MCNC: NEGATIVE
LEUKOCYTE ESTERASE UR QL STRIP: NEGATIVE
NITRITE UR QL STRIP: NEGATIVE
PH UR STRIP: 6
PROT UR STRIP-MCNC: NEGATIVE
SP GR UR STRIP: 1

## 2025-05-20 PROCEDURE — 81003 URINALYSIS AUTO W/O SCOPE: CPT | Mod: QW

## 2025-05-20 PROCEDURE — 99459 PELVIC EXAMINATION: CPT

## 2025-05-20 PROCEDURE — 99396 PREV VISIT EST AGE 40-64: CPT

## 2025-05-20 PROCEDURE — 85018 HEMOGLOBIN: CPT | Mod: QW

## 2025-05-20 PROCEDURE — 82270 OCCULT BLOOD FECES: CPT

## 2025-05-27 ENCOUNTER — NON-APPOINTMENT (OUTPATIENT)
Age: 45
End: 2025-05-27

## 2025-09-04 ENCOUNTER — APPOINTMENT (OUTPATIENT)
Dept: DERMATOLOGY | Facility: CLINIC | Age: 45
End: 2025-09-04

## 2025-09-04 DIAGNOSIS — D18.01 HEMANGIOMA OF SKIN AND SUBCUTANEOUS TISSUE: ICD-10-CM

## 2025-09-04 DIAGNOSIS — D48.5 NEOPLASM OF UNCERTAIN BEHAVIOR OF SKIN: ICD-10-CM

## 2025-09-04 DIAGNOSIS — D22.9 MELANOCYTIC NEVI, UNSPECIFIED: ICD-10-CM

## 2025-09-04 PROCEDURE — 11102 TANGNTL BX SKIN SINGLE LES: CPT

## 2025-09-04 PROCEDURE — 99213 OFFICE O/P EST LOW 20 MIN: CPT | Mod: 25

## 2025-09-11 LAB — CORE LAB BIOPSY: NORMAL
